# Patient Record
Sex: FEMALE | Race: WHITE | Employment: UNEMPLOYED | URBAN - METROPOLITAN AREA
[De-identification: names, ages, dates, MRNs, and addresses within clinical notes are randomized per-mention and may not be internally consistent; named-entity substitution may affect disease eponyms.]

---

## 2017-11-18 ENCOUNTER — HOSPITAL ENCOUNTER (INPATIENT)
Age: 52
LOS: 13 days | Discharge: HOME | DRG: 885 | End: 2017-12-01
Attending: PSYCHIATRY & NEUROLOGY | Admitting: PSYCHIATRY & NEUROLOGY
Payer: MEDICARE

## 2017-11-18 PROBLEM — F31.9 BIPOLAR 1 DISORDER (HCC): Status: ACTIVE | Noted: 2017-11-18

## 2017-11-18 LAB
ABSOLUTE EOS #: 0.2 K/UL (ref 0–0.4)
ABSOLUTE IMMATURE GRANULOCYTE: ABNORMAL K/UL (ref 0–0.3)
ABSOLUTE LYMPH #: 1.7 K/UL (ref 1–4.8)
ABSOLUTE MONO #: 0.9 K/UL (ref 0.1–1.3)
ALBUMIN SERPL-MCNC: 4.1 G/DL (ref 3.5–5.2)
ALBUMIN/GLOBULIN RATIO: ABNORMAL (ref 1–2.5)
ALP BLD-CCNC: 92 U/L (ref 35–104)
ALT SERPL-CCNC: 24 U/L (ref 5–33)
ANION GAP SERPL CALCULATED.3IONS-SCNC: 14 MMOL/L (ref 9–17)
AST SERPL-CCNC: 31 U/L
BASOPHILS # BLD: 1 %
BASOPHILS ABSOLUTE: 0 K/UL (ref 0–0.2)
BILIRUB SERPL-MCNC: 0.22 MG/DL (ref 0.3–1.2)
BUN BLDV-MCNC: 16 MG/DL (ref 6–20)
BUN/CREAT BLD: ABNORMAL (ref 9–20)
CALCIUM SERPL-MCNC: 9.3 MG/DL (ref 8.6–10.4)
CHLORIDE BLD-SCNC: 102 MMOL/L (ref 98–107)
CO2: 22 MMOL/L (ref 20–31)
CREAT SERPL-MCNC: 0.78 MG/DL (ref 0.5–0.9)
DIFFERENTIAL TYPE: ABNORMAL
EOSINOPHILS RELATIVE PERCENT: 3 %
GFR AFRICAN AMERICAN: >60 ML/MIN
GFR NON-AFRICAN AMERICAN: >60 ML/MIN
GFR SERPL CREATININE-BSD FRML MDRD: ABNORMAL ML/MIN/{1.73_M2}
GFR SERPL CREATININE-BSD FRML MDRD: ABNORMAL ML/MIN/{1.73_M2}
GLUCOSE BLD-MCNC: 95 MG/DL (ref 70–99)
HCT VFR BLD CALC: 36.7 % (ref 36–46)
HEMOGLOBIN: 12.7 G/DL (ref 12–16)
IMMATURE GRANULOCYTES: ABNORMAL %
LYMPHOCYTES # BLD: 28 %
MCH RBC QN AUTO: 32.8 PG (ref 26–34)
MCHC RBC AUTO-ENTMCNC: 34.5 G/DL (ref 31–37)
MCV RBC AUTO: 95.3 FL (ref 80–100)
MONOCYTES # BLD: 14 %
PDW BLD-RTO: 13.3 % (ref 11.5–14.9)
PLATELET # BLD: 372 K/UL (ref 150–450)
PLATELET ESTIMATE: ABNORMAL
PMV BLD AUTO: 6.4 FL (ref 6–12)
POTASSIUM SERPL-SCNC: 4.4 MMOL/L (ref 3.7–5.3)
RBC # BLD: 3.85 M/UL (ref 4–5.2)
RBC # BLD: ABNORMAL 10*6/UL
SEG NEUTROPHILS: 54 %
SEGMENTED NEUTROPHILS ABSOLUTE COUNT: 3.4 K/UL (ref 1.3–9.1)
SODIUM BLD-SCNC: 138 MMOL/L (ref 135–144)
THYROXINE, FREE: 0.74 NG/DL (ref 0.93–1.7)
TOTAL PROTEIN: 6.5 G/DL (ref 6.4–8.3)
TSH SERPL DL<=0.05 MIU/L-ACNC: 1.16 MIU/L (ref 0.3–5)
WBC # BLD: 6.2 K/UL (ref 3.5–11)
WBC # BLD: ABNORMAL 10*3/UL

## 2017-11-18 PROCEDURE — 6370000000 HC RX 637 (ALT 250 FOR IP): Performed by: PSYCHIATRY & NEUROLOGY

## 2017-11-18 PROCEDURE — 84443 ASSAY THYROID STIM HORMONE: CPT

## 2017-11-18 PROCEDURE — 85025 COMPLETE CBC W/AUTO DIFF WBC: CPT

## 2017-11-18 PROCEDURE — 36415 COLL VENOUS BLD VENIPUNCTURE: CPT

## 2017-11-18 PROCEDURE — 80053 COMPREHEN METABOLIC PANEL: CPT

## 2017-11-18 PROCEDURE — 1240000000 HC EMOTIONAL WELLNESS R&B

## 2017-11-18 PROCEDURE — 84439 ASSAY OF FREE THYROXINE: CPT

## 2017-11-18 RX ORDER — CARBAMAZEPINE 300 MG/1
300 CAPSULE, EXTENDED RELEASE ORAL 2 TIMES DAILY
Status: DISCONTINUED | OUTPATIENT
Start: 2017-11-18 | End: 2017-11-26

## 2017-11-18 RX ORDER — CHLORPROMAZINE HYDROCHLORIDE 50 MG/1
200 TABLET, FILM COATED ORAL NIGHTLY
Status: ON HOLD | COMMUNITY
End: 2017-11-30 | Stop reason: HOSPADM

## 2017-11-18 RX ORDER — HYDROCHLOROTHIAZIDE 25 MG/1
25 TABLET ORAL DAILY
Status: DISCONTINUED | OUTPATIENT
Start: 2017-11-18 | End: 2017-12-01 | Stop reason: HOSPADM

## 2017-11-18 RX ORDER — ONDANSETRON 4 MG/1
4 TABLET, ORALLY DISINTEGRATING ORAL EVERY 4 HOURS PRN
Status: ON HOLD | COMMUNITY
End: 2017-11-30 | Stop reason: HOSPADM

## 2017-11-18 RX ORDER — METOPROLOL TARTRATE 50 MG/1
50 TABLET, FILM COATED ORAL 2 TIMES DAILY
Status: DISCONTINUED | OUTPATIENT
Start: 2017-11-18 | End: 2017-12-01 | Stop reason: HOSPADM

## 2017-11-18 RX ORDER — ESCITALOPRAM OXALATE 20 MG/1
20 TABLET ORAL DAILY
Status: DISCONTINUED | OUTPATIENT
Start: 2017-11-18 | End: 2017-12-01 | Stop reason: HOSPADM

## 2017-11-18 RX ORDER — CLONAZEPAM 1 MG/1
1 TABLET ORAL 3 TIMES DAILY PRN
Status: DISCONTINUED | OUTPATIENT
Start: 2017-11-18 | End: 2017-11-20

## 2017-11-18 RX ORDER — CARBAMAZEPINE 100 MG/1
300 TABLET, EXTENDED RELEASE ORAL 2 TIMES DAILY
Status: ON HOLD | COMMUNITY
End: 2017-11-30 | Stop reason: HOSPADM

## 2017-11-18 RX ORDER — AMLODIPINE BESYLATE 10 MG/1
10 TABLET ORAL NIGHTLY
Status: DISCONTINUED | OUTPATIENT
Start: 2017-11-18 | End: 2017-12-01 | Stop reason: HOSPADM

## 2017-11-18 RX ORDER — ONDANSETRON 4 MG/1
4 TABLET, FILM COATED ORAL EVERY 8 HOURS PRN
Status: DISCONTINUED | OUTPATIENT
Start: 2017-11-18 | End: 2017-12-01 | Stop reason: HOSPADM

## 2017-11-18 RX ORDER — HYDRALAZINE HYDROCHLORIDE 50 MG/1
50 TABLET, FILM COATED ORAL 2 TIMES DAILY
Status: ON HOLD | COMMUNITY
End: 2017-11-30

## 2017-11-18 RX ORDER — QUETIAPINE FUMARATE 200 MG/1
400 TABLET, FILM COATED ORAL NIGHTLY
Status: DISCONTINUED | OUTPATIENT
Start: 2017-11-18 | End: 2017-12-01 | Stop reason: HOSPADM

## 2017-11-18 RX ORDER — POLYETHYLENE GLYCOL 3350 17 G/17G
17 POWDER, FOR SOLUTION ORAL DAILY PRN
Status: ON HOLD | COMMUNITY
End: 2017-11-30

## 2017-11-18 RX ORDER — HYDROCHLOROTHIAZIDE 25 MG/1
25 TABLET ORAL DAILY
Status: ON HOLD | COMMUNITY
End: 2017-11-30

## 2017-11-18 RX ORDER — GABAPENTIN 400 MG/1
400 CAPSULE ORAL 2 TIMES DAILY
Status: ON HOLD | COMMUNITY
End: 2017-11-30

## 2017-11-18 RX ORDER — CLONAZEPAM 1 MG/1
1 TABLET ORAL 3 TIMES DAILY
Status: ON HOLD | COMMUNITY
End: 2017-11-30

## 2017-11-18 RX ORDER — DOXEPIN HYDROCHLORIDE 50 MG/1
100 CAPSULE ORAL NIGHTLY
Status: DISCONTINUED | OUTPATIENT
Start: 2017-11-18 | End: 2017-12-01 | Stop reason: HOSPADM

## 2017-11-18 RX ORDER — QUETIAPINE 400 MG/1
400 TABLET, FILM COATED, EXTENDED RELEASE ORAL NIGHTLY
Status: ON HOLD | COMMUNITY
End: 2017-11-30

## 2017-11-18 RX ORDER — GABAPENTIN 400 MG/1
400 CAPSULE ORAL 2 TIMES DAILY
Status: DISCONTINUED | OUTPATIENT
Start: 2017-11-18 | End: 2017-12-01 | Stop reason: HOSPADM

## 2017-11-18 RX ORDER — METOPROLOL TARTRATE 50 MG/1
50 TABLET, FILM COATED ORAL 2 TIMES DAILY
Status: ON HOLD | COMMUNITY
End: 2017-11-30

## 2017-11-18 RX ORDER — NICOTINE 21 MG/24HR
1 PATCH, TRANSDERMAL 24 HOURS TRANSDERMAL DAILY
Status: DISCONTINUED | OUTPATIENT
Start: 2017-11-18 | End: 2017-11-23

## 2017-11-18 RX ORDER — HYDRALAZINE HYDROCHLORIDE 50 MG/1
50 TABLET, FILM COATED ORAL EVERY 12 HOURS SCHEDULED
Status: DISCONTINUED | OUTPATIENT
Start: 2017-11-18 | End: 2017-12-01 | Stop reason: HOSPADM

## 2017-11-18 RX ORDER — CHLORPROMAZINE HYDROCHLORIDE 50 MG/1
100 TABLET, FILM COATED ORAL DAILY
Status: ON HOLD | COMMUNITY
End: 2017-11-30 | Stop reason: HOSPADM

## 2017-11-18 RX ORDER — CHLORPROMAZINE HYDROCHLORIDE 50 MG/1
200 TABLET, FILM COATED ORAL DAILY
Status: ON HOLD | COMMUNITY
End: 2017-11-30 | Stop reason: HOSPADM

## 2017-11-18 RX ORDER — ATORVASTATIN CALCIUM 10 MG/1
10 TABLET, FILM COATED ORAL NIGHTLY
Status: DISCONTINUED | OUTPATIENT
Start: 2017-11-18 | End: 2017-12-01 | Stop reason: HOSPADM

## 2017-11-18 RX ORDER — ESCITALOPRAM OXALATE 10 MG/1
10 TABLET ORAL DAILY
Status: ON HOLD | COMMUNITY
End: 2017-11-30 | Stop reason: HOSPADM

## 2017-11-18 RX ORDER — HYDROXYZINE HYDROCHLORIDE 25 MG/1
25 TABLET, FILM COATED ORAL 3 TIMES DAILY PRN
Status: DISCONTINUED | OUTPATIENT
Start: 2017-11-18 | End: 2017-12-01 | Stop reason: HOSPADM

## 2017-11-18 RX ORDER — ACETAMINOPHEN 325 MG/1
325 TABLET ORAL EVERY 8 HOURS PRN
Status: DISCONTINUED | OUTPATIENT
Start: 2017-11-18 | End: 2017-12-01 | Stop reason: HOSPADM

## 2017-11-18 RX ORDER — DOXEPIN HYDROCHLORIDE 100 MG/1
100 CAPSULE ORAL NIGHTLY
Status: ON HOLD | COMMUNITY
End: 2017-11-30

## 2017-11-18 RX ORDER — FLUTICASONE PROPIONATE 50 MCG
1 SPRAY, SUSPENSION (ML) NASAL DAILY
Status: DISCONTINUED | OUTPATIENT
Start: 2017-11-18 | End: 2017-11-24

## 2017-11-18 RX ORDER — POLYETHYLENE GLYCOL 3350 17 G/17G
17 POWDER, FOR SOLUTION ORAL DAILY
Status: DISCONTINUED | OUTPATIENT
Start: 2017-11-18 | End: 2017-12-01 | Stop reason: HOSPADM

## 2017-11-18 RX ADMIN — HYDROCHLOROTHIAZIDE 25 MG: 25 TABLET ORAL at 12:47

## 2017-11-18 RX ADMIN — QUETIAPINE FUMARATE 400 MG: 200 TABLET ORAL at 22:23

## 2017-11-18 RX ADMIN — POLYETHYLENE GLYCOL 3350 17 G: 17 POWDER, FOR SOLUTION ORAL at 13:54

## 2017-11-18 RX ADMIN — METOPROLOL TARTRATE 50 MG: 50 TABLET ORAL at 22:22

## 2017-11-18 RX ADMIN — CLONAZEPAM 1 MG: 1 TABLET ORAL at 17:11

## 2017-11-18 RX ADMIN — ATORVASTATIN CALCIUM 10 MG: 10 TABLET, FILM COATED ORAL at 22:22

## 2017-11-18 RX ADMIN — HYDRALAZINE HYDROCHLORIDE 50 MG: 50 TABLET, FILM COATED ORAL at 22:29

## 2017-11-18 RX ADMIN — FLUTICASONE PROPIONATE 1 SPRAY: 50 SPRAY, METERED NASAL at 13:54

## 2017-11-18 RX ADMIN — ESCITALOPRAM OXALATE 20 MG: 20 TABLET ORAL at 12:47

## 2017-11-18 RX ADMIN — METOPROLOL TARTRATE 50 MG: 50 TABLET ORAL at 12:47

## 2017-11-18 RX ADMIN — CARBAMAZEPINE 300 MG: 300 CAPSULE, EXTENDED RELEASE ORAL at 13:54

## 2017-11-18 RX ADMIN — AMLODIPINE BESYLATE 10 MG: 10 TABLET ORAL at 22:22

## 2017-11-18 RX ADMIN — HYDROXYZINE HYDROCHLORIDE 25 MG: 25 TABLET, FILM COATED ORAL at 22:22

## 2017-11-18 RX ADMIN — CARIPRAZINE 1.5 MG: 1.5 CAPSULE, GELATIN COATED ORAL at 13:54

## 2017-11-18 RX ADMIN — GABAPENTIN 400 MG: 400 CAPSULE ORAL at 12:47

## 2017-11-18 RX ADMIN — GABAPENTIN 400 MG: 400 CAPSULE ORAL at 22:22

## 2017-11-18 RX ADMIN — CARBAMAZEPINE 300 MG: 300 CAPSULE, EXTENDED RELEASE ORAL at 22:29

## 2017-11-18 RX ADMIN — DOXEPIN HYDROCHLORIDE 100 MG: 50 CAPSULE ORAL at 22:26

## 2017-11-18 ASSESSMENT — SLEEP AND FATIGUE QUESTIONNAIRES
AVERAGE NUMBER OF SLEEP HOURS: 4
DIFFICULTY STAYING ASLEEP: YES
DO YOU HAVE DIFFICULTY SLEEPING: YES
RESTFUL SLEEP: NO
SLEEP PATTERN: RESTLESSNESS;DIFFICULTY FALLING ASLEEP;DISTURBED/INTERRUPTED SLEEP
DO YOU USE A SLEEP AID: YES
DIFFICULTY ARISING: NO
DIFFICULTY FALLING ASLEEP: YES

## 2017-11-18 ASSESSMENT — PATIENT HEALTH QUESTIONNAIRE - PHQ9: SUM OF ALL RESPONSES TO PHQ QUESTIONS 1-9: 18

## 2017-11-18 ASSESSMENT — LIFESTYLE VARIABLES: HISTORY_ALCOHOL_USE: NO

## 2017-11-18 NOTE — PLAN OF CARE
Problem: Altered Mood, Depressive Behavior  Goal: LTG-Able to verbalize and/or display a decrease in depressive symptoms  Outcome: Ongoing  5 [facility]  Initial Interdisciplinary Treatment Plan NOTE    Original treatment plan Date & Time: 11/18/17 0825    Admission Type:  Admission Type: Voluntary    Reason for admission:   Reason for Admission: depression    Estimated Length of Stay:  5-7days  Estimated Discharge Date:  11/24/17 to be determined by physician    PATIENT STRENGTHS:  Patient Strengths:Strengths: Medication Compliance  Patient Strengths and Limitations:Limitations: Difficulty problem solving/relies on others to help solve problems  Addictive Behavior: Addictive Behavior  In the past 3 months, have you felt or has someone told you that you have a problem with:  : None  Do you have a history of Chemical Use?: No  Do you have a history of Alcohol Use?: No  Do you have a history of Street Drug Abuse?: No  Histroy of Prescripton Drug Abuse?: No  Medical Problems:  Past Medical History:   Diagnosis Date    Bipolar 1 disorder ([facility]     Brain aneurysm     coil stent placement    GERD (gastroesophageal reflux disease)     Hyperlipidemia     Hypertension     Interstitial lung disease ([facility]     Panic attacks      Status EXAM:Status and Exam  Normal: No  Facial Expression: Avoids Gaze, Flat, Expressionless  Affect: Appropriate  Level of Consciousness: Alert  Mood:Normal: No  Mood: Depressed, Helpless  Motor Activity:Normal: Yes  Interview Behavior: Cooperative, Evasive  Preception: [place] to Person, Antonetta Shave to Time, [place] to Place, [place] to Situation  Attention:Normal: No  Attention: Distractible  Thought Processes: Circumstantial  Thought Content:Normal: No  Thought Content: Preoccupations, Poverty of Content  Hallucinations: None  Delusions: No  Memory:Normal: No  Memory:  (WNL)  Insight and Judgment: No  Insight and Judgment: Poor Judgment, Poor Insight  Present Suicidal Ideation: No  Present Homicidal Ideation: No    EDUCATION:   Learner Progress Toward Treatment Goals:  Reviewed group plans and strategies for care    Method:  Group therapy, medication compliance, individualized assessments and care planning    Outcome:  Needs reinforcement    PATIENT GOALS:  Pt did not identify, to be discussed with patient within 72 hours.     PLAN/TREATMENT RECOMMENDATIONS:   Group therapy, medications compliance, goal setting, individualized assessments and care, continue to monitor pt on unit      SHORT-TERM GOALS:   Time frame for Short-Term Goals:  5-7 days    LONG-TERM GOALS:  Time frame for Long-Term Goals:  6 months    Members Present in Team Meeting:     SP Lisa  Goal: STG-Able to verbalize suicidal ideations  Outcome: Ongoing

## 2017-11-18 NOTE — PLAN OF CARE
Problem: Altered Mood, Depressive Behavior  Goal: STG-Able to verbalize suicidal ideations  Outcome: Ongoing  Pt was able to verbalize a current absence in suicidal ideations. Pt denies thoughts to hurt self or others. Pt denies all hallucinations. Pt was observed socializing with staff and peers. Pt was encouraged to communicate with staff if symptoms worsen or needs arise. Pt independence was promoted. Pt remained isolative.

## 2017-11-19 PROCEDURE — 1240000000 HC EMOTIONAL WELLNESS R&B

## 2017-11-19 PROCEDURE — 6370000000 HC RX 637 (ALT 250 FOR IP): Performed by: PSYCHIATRY & NEUROLOGY

## 2017-11-19 RX ORDER — SUCRALFATE 1 G/1
1 TABLET ORAL
Status: DISCONTINUED | OUTPATIENT
Start: 2017-11-19 | End: 2017-12-01 | Stop reason: HOSPADM

## 2017-11-19 RX ADMIN — METOPROLOL TARTRATE 50 MG: 50 TABLET ORAL at 21:19

## 2017-11-19 RX ADMIN — QUETIAPINE FUMARATE 400 MG: 200 TABLET ORAL at 21:19

## 2017-11-19 RX ADMIN — CARIPRAZINE 1.5 MG: 1.5 CAPSULE, GELATIN COATED ORAL at 08:22

## 2017-11-19 RX ADMIN — CLONAZEPAM 1 MG: 1 TABLET ORAL at 03:28

## 2017-11-19 RX ADMIN — ACETAMINOPHEN 325 MG: 325 TABLET, FILM COATED ORAL at 19:48

## 2017-11-19 RX ADMIN — GABAPENTIN 400 MG: 400 CAPSULE ORAL at 21:19

## 2017-11-19 RX ADMIN — CARBAMAZEPINE 300 MG: 300 CAPSULE, EXTENDED RELEASE ORAL at 08:22

## 2017-11-19 RX ADMIN — METOPROLOL TARTRATE 50 MG: 50 TABLET ORAL at 08:22

## 2017-11-19 RX ADMIN — AMLODIPINE BESYLATE 10 MG: 10 TABLET ORAL at 21:19

## 2017-11-19 RX ADMIN — ESCITALOPRAM OXALATE 20 MG: 20 TABLET ORAL at 08:22

## 2017-11-19 RX ADMIN — HYDROCHLOROTHIAZIDE 25 MG: 25 TABLET ORAL at 08:23

## 2017-11-19 RX ADMIN — HYDRALAZINE HYDROCHLORIDE 50 MG: 50 TABLET, FILM COATED ORAL at 08:22

## 2017-11-19 RX ADMIN — HYDROXYZINE HYDROCHLORIDE 25 MG: 25 TABLET, FILM COATED ORAL at 21:19

## 2017-11-19 RX ADMIN — LINACLOTIDE 145 MCG: 145 CAPSULE, GELATIN COATED ORAL at 06:33

## 2017-11-19 RX ADMIN — CLONAZEPAM 1 MG: 1 TABLET ORAL at 10:54

## 2017-11-19 RX ADMIN — ATORVASTATIN CALCIUM 10 MG: 10 TABLET, FILM COATED ORAL at 21:19

## 2017-11-19 RX ADMIN — FLUTICASONE PROPIONATE 1 SPRAY: 50 SPRAY, METERED NASAL at 08:24

## 2017-11-19 RX ADMIN — POLYETHYLENE GLYCOL 3350 17 G: 17 POWDER, FOR SOLUTION ORAL at 08:24

## 2017-11-19 RX ADMIN — CARBAMAZEPINE 300 MG: 300 CAPSULE, EXTENDED RELEASE ORAL at 21:19

## 2017-11-19 RX ADMIN — SUCRALFATE 1 G: 1 TABLET ORAL at 16:42

## 2017-11-19 RX ADMIN — DOXEPIN HYDROCHLORIDE 100 MG: 50 CAPSULE ORAL at 21:19

## 2017-11-19 RX ADMIN — GABAPENTIN 400 MG: 400 CAPSULE ORAL at 08:22

## 2017-11-19 RX ADMIN — CLONAZEPAM 1 MG: 1 TABLET ORAL at 21:19

## 2017-11-19 RX ADMIN — ACETAMINOPHEN 325 MG: 325 TABLET, FILM COATED ORAL at 06:33

## 2017-11-19 RX ADMIN — HYDROXYZINE HYDROCHLORIDE 25 MG: 25 TABLET, FILM COATED ORAL at 08:23

## 2017-11-19 RX ADMIN — HYDRALAZINE HYDROCHLORIDE 50 MG: 50 TABLET, FILM COATED ORAL at 21:19

## 2017-11-19 ASSESSMENT — SLEEP AND FATIGUE QUESTIONNAIRES
DO YOU USE A SLEEP AID: YES
DIFFICULTY FALLING ASLEEP: YES
DO YOU HAVE DIFFICULTY SLEEPING: YES
AVERAGE NUMBER OF SLEEP HOURS: 4
DIFFICULTY ARISING: YES
DIFFICULTY STAYING ASLEEP: YES
SLEEP PATTERN: DIFFICULTY FALLING ASLEEP;RESTLESSNESS;INSOMNIA
RESTFUL SLEEP: NO

## 2017-11-19 ASSESSMENT — PAIN DESCRIPTION - DESCRIPTORS: DESCRIPTORS: HEADACHE

## 2017-11-19 ASSESSMENT — LIFESTYLE VARIABLES: HISTORY_ALCOHOL_USE: NO

## 2017-11-19 ASSESSMENT — PAIN DESCRIPTION - FREQUENCY: FREQUENCY: INTERMITTENT

## 2017-11-19 ASSESSMENT — PAIN SCALES - GENERAL
PAINLEVEL_OUTOF10: 6
PAINLEVEL_OUTOF10: 3
PAINLEVEL_OUTOF10: 1
PAINLEVEL_OUTOF10: 0

## 2017-11-19 ASSESSMENT — PAIN DESCRIPTION - PAIN TYPE: TYPE: ACUTE PAIN

## 2017-11-19 ASSESSMENT — PAIN DESCRIPTION - LOCATION: LOCATION: HEAD

## 2017-11-19 NOTE — CARE COORDINATION
BHI Biopsychosocial Assessment    Current Level of Psychosocial Functioning     Independent   Dependent  X  Minimal Assist     Comments:  Writer meets with PT and completes the biopsychosocial assessment. PT presents with elevated mood, anxiety and hyperactivity. PT states has feelings of sadness, hopelessness, worthlessness and depressed mood. PT states feeling sad on a daily basis for the past few weeks and have been very lonely living in a hotel room by myself.   PT has a loss of interest in doing pleasurable activities; no appetite; and, sleeping only a few hours a night. PT states \"I'm hoping to live with my daughter Luiza Robert when I get discharged. \"  Uvaldo Hurt will get an JENNIFER for daughter and [employer]. PT states family history of mental illness. PT states lost her home; has been staying at a hotel; and has been trying to get additional life insurance \"in case I die because $5,000 will not bury me. \"  PT states ; has 3 sisters and 1 brother, states all have mental health issues. PT states \"I'm not really feeling like I want to hurt myself right now and feel safe. \"  PT also states \"I'm so happy I have Dr. Alberto Lechuga because he listens to me. \"  PT states has 2 daughters and limited contact except with 1 daughter, Luiza Robert. During assessment writer and PT talk about behavioral plan of care while at [facility] to include: group psychotherapy, recreational therapy, recovery group, spirituality group, medication education group as well as psycho-educational group. Writer also talks with PT about the importance of being active in treatment planning, which will be discussed within 48 hours of being admitted.     Psychosocial High Risk Factors (check all that apply)    Unable to obtain meds   Chronic illness/pain    Substance abuse   Lack of Family Support X  Financial stress X  Isolation X  Inadequate Community Resources X  Suicide attempt(s) X  Not taking medications X  Victim of crime   Developmental Delay  Unable to manage personal needs    Age 72 or older   Homeless X  No transportation X  Readmission within 30 days  Unemployment X  Traumatic Event    Psychiatric Advanced Directives: Unknown    Family to Involve in Treatment: PT states no    Sexual Orientation:  Unknown    Patient Strengths: SSI; Medicare/Medicaid; linked with [facility]    Patient Barriers: SI and history; homelessness; living in hotel and out of money; poor family and friend support; poor insight/ poor judgment; difficulty problem solving/relies on others to help solve problems; lacks motivation; on/off medications; family history of mental illness; multiple life stressors and financial difficulties. Opiate Education Provided:  PT states \"does not use any drugs. \"    CMHC/mental health history: [facility]    Plan of Care   medication management, group/individual therapies, family meetings, psycho -education, treatment team meetings to assist with stabilization    Initial Discharge Plan:  PT states when discharged can go and live with daughter, Taj Workman will need to get a JENNIFER. PT will have an appointment with Marcene Dubin when discharged.     Clinical Summary:  PT diagnosis:  Bipolar I Disorder, depressive type; Suicidal Ideation

## 2017-11-19 NOTE — PLAN OF CARE
to Place, [place] to Situation  Attention:Normal: No  Attention: Hyperalert  Thought Processes: Flt.of Ideas  Thought Content:Normal: No  Thought Content: Delusions, Preoccupations, Obsessions  Hallucinations: None  Delusions: No  Memory:Normal: Yes  Memory:  (WNL)  Insight and Judgment: No  Insight and Judgment: Poor Judgment, Poor Insight, Unrealistic  Present Suicidal Ideation: No  Present Homicidal Ideation: No    Daily Assessment Last Entry:   Daily Sleep (WDL): Within Defined Limits         Patient Currently in Pain: Denies  Daily Nutrition (WDL): Within Defined Limits    Patient Monitoring:  Frequency of Checks: 4 times per hour, close    Psychiatric Symptoms:   Depression Symptoms  Depression Symptoms: Loss of interest  Anxiety Symptoms  Anxiety Symptoms: Generalized  Yary Symptoms  Yary Symptoms: No problems reported or observed. Psychosis Symptoms  Delusion Type: No problems reported or observed.     Suicide Risk CSSR-S:  1) Within the past month, have you wished you were dead or wished you could go to sleep and not wake up? : NO  2) Within the past month, have you actually had any thoughts of killing yourself? : NO  6)  Have you ever done anything, started to do anything, or prepared to do anything to end your life?: NO  Change in Result no Change in Plan of care no      EDUCATION:   EDUCATION:   Learner Progress Toward Treatment Goals: Reviewed results and recommendations of this team, Reviewed group plan and strategies, Reviewed signs, symptoms and risk of self harm and violent behavior, Reviewed goals and plan of care    Method:small group, individual verbal education    Outcome:verbalized by patient, but needs reinforcement to obtain goals    PATIENT GOALS:  Short term: decreasing suicidal thougths   Long term: not to overthink things    PLAN/TREATMENT RECOMMENDATIONS UPDATE: continue with group therapies, increased socialization, continue planning for after discharge goals, continue with medication compliance    SHORT-TERM GOALS UPDATE:   Time frame for Short-Term Goals: 5-7 days    LONG-TERM GOALS UPDATE:   Time frame for Long-Term Goals: 6 months  Members Present in Team Meeting: See Signature Sheet    SP CAMARGO  Goal: STG-Able to verbalize suicidal ideations  Outcome: Ongoing

## 2017-11-19 NOTE — PLAN OF CARE
Problem: Altered Mood, Depressive Behavior  Goal: LTG-Able to verbalize and/or display a decrease in depressive symptoms  Outcome: Ongoing  Pt was out in the milieu for long intervals. She was social with peers. She was medication compliant. She stated that she is feeling better. Goal: STG-Able to verbalize suicidal ideations  Outcome: Ongoing  Pt denied current suicidal ideations.

## 2017-11-19 NOTE — PROGRESS NOTES
PSYCHOEDUCATION GROUP NOTE    Date:   11/19/2017 Start Time: 0845  End Time: 0915    Number Participants in Group:  7    Goal:  Patient will demonstrate increased interpersonal interaction   Topic: community meeting / goal setting    Discipline Responsible:   OT  AT  [facility]. X RT MHP Other       Participation Level:     None  Minimal   X Active Listener X Interactive    Monopolizing         Participation Quality:  X Appropriate  Inappropriate   X       Attentive        Intrusive   X       Sharing        Resistant   X       Supportive        Lethargic       Affective:    Congruent  Incongruent  Blunted  Flat    Constricted  Anxious  Elated  Angry    Labile  Depressed  Other x bright       Cognitive:  X Alert X Oriented PPTP     Concentration X G  F  P   Attention Span X G  F  P   Short-Term Memory  G  F  P   Long-Term Memory  G  F  P   ProblemSolving/  Decision Making X G  F  P   Ability to Process  Information X G  F  P      Contributing Factors             Delusional             Hallucinating             Flight of Ideas             Other:       Modes of Intervention:  x Education x Support x Exploration    Clarifying x Problem Solving  Confrontation   x Socialization  Limit Setting  Reality Testing   x Activity  Movement  Media    Other:            Response to Learning:  X Able to verbalize current knowledge/experience   X Able to verbalize/acknowledge new learning   X Able to retain information   X Capable of insight    Able to change behavior   X Progressing to goal    Other:        Comments

## 2017-11-19 NOTE — PLAN OF CARE
Problem: Altered Mood, Depressive Behavior  Intervention: Self-harm assessment-behavioral health  Pt denies having any thoughts of wanting to harm self or others, safety checks are being continued and maintained throughout writers shift.

## 2017-11-20 ENCOUNTER — APPOINTMENT (OUTPATIENT)
Dept: CT IMAGING | Age: 52
DRG: 885 | End: 2017-11-20
Attending: PSYCHIATRY & NEUROLOGY
Payer: MEDICARE

## 2017-11-20 LAB
EKG ATRIAL RATE: 69 BPM
EKG P AXIS: 58 DEGREES
EKG P-R INTERVAL: 176 MS
EKG Q-T INTERVAL: 428 MS
EKG QRS DURATION: 86 MS
EKG QTC CALCULATION (BAZETT): 458 MS
EKG R AXIS: -8 DEGREES
EKG T AXIS: 55 DEGREES
EKG VENTRICULAR RATE: 69 BPM

## 2017-11-20 PROCEDURE — 99234 HOSP IP/OBS SM DT SF/LOW 45: CPT | Performed by: INTERNAL MEDICINE

## 2017-11-20 PROCEDURE — 94664 DEMO&/EVAL PT USE INHALER: CPT

## 2017-11-20 PROCEDURE — 70470 CT HEAD/BRAIN W/O & W/DYE: CPT

## 2017-11-20 PROCEDURE — 2580000003 HC RX 258: Performed by: PSYCHIATRY & NEUROLOGY

## 2017-11-20 PROCEDURE — 6370000000 HC RX 637 (ALT 250 FOR IP): Performed by: PSYCHIATRY & NEUROLOGY

## 2017-11-20 PROCEDURE — 1240000000 HC EMOTIONAL WELLNESS R&B

## 2017-11-20 PROCEDURE — 6360000004 HC RX CONTRAST MEDICATION: Performed by: PSYCHIATRY & NEUROLOGY

## 2017-11-20 PROCEDURE — 93005 ELECTROCARDIOGRAM TRACING: CPT

## 2017-11-20 RX ORDER — 0.9 % SODIUM CHLORIDE 0.9 %
100 INTRAVENOUS SOLUTION INTRAVENOUS ONCE
Status: COMPLETED | OUTPATIENT
Start: 2017-11-20 | End: 2017-11-20

## 2017-11-20 RX ORDER — SODIUM CHLORIDE 0.9 % (FLUSH) 0.9 %
10 SYRINGE (ML) INJECTION PRN
Status: DISCONTINUED | OUTPATIENT
Start: 2017-11-20 | End: 2017-12-01 | Stop reason: HOSPADM

## 2017-11-20 RX ORDER — ALBUTEROL SULFATE 90 UG/1
2 AEROSOL, METERED RESPIRATORY (INHALATION) EVERY 6 HOURS PRN
Status: DISCONTINUED | OUTPATIENT
Start: 2017-11-20 | End: 2017-12-01 | Stop reason: HOSPADM

## 2017-11-20 RX ORDER — CLONAZEPAM 1 MG/1
1 TABLET ORAL 3 TIMES DAILY
Status: DISCONTINUED | OUTPATIENT
Start: 2017-11-20 | End: 2017-12-01 | Stop reason: HOSPADM

## 2017-11-20 RX ADMIN — AMLODIPINE BESYLATE 10 MG: 10 TABLET ORAL at 21:12

## 2017-11-20 RX ADMIN — HYDRALAZINE HYDROCHLORIDE 50 MG: 50 TABLET, FILM COATED ORAL at 21:43

## 2017-11-20 RX ADMIN — HYDRALAZINE HYDROCHLORIDE 50 MG: 50 TABLET, FILM COATED ORAL at 09:12

## 2017-11-20 RX ADMIN — GABAPENTIN 400 MG: 400 CAPSULE ORAL at 09:12

## 2017-11-20 RX ADMIN — GABAPENTIN 400 MG: 400 CAPSULE ORAL at 21:13

## 2017-11-20 RX ADMIN — CLONAZEPAM 1 MG: 1 TABLET ORAL at 13:34

## 2017-11-20 RX ADMIN — CLONAZEPAM 1 MG: 1 TABLET ORAL at 21:13

## 2017-11-20 RX ADMIN — METOPROLOL TARTRATE 50 MG: 50 TABLET ORAL at 21:12

## 2017-11-20 RX ADMIN — CARIPRAZINE 1.5 MG: 1.5 CAPSULE, GELATIN COATED ORAL at 09:12

## 2017-11-20 RX ADMIN — CARBAMAZEPINE 300 MG: 300 CAPSULE, EXTENDED RELEASE ORAL at 09:12

## 2017-11-20 RX ADMIN — HYDROCHLOROTHIAZIDE 25 MG: 25 TABLET ORAL at 09:12

## 2017-11-20 RX ADMIN — DOXEPIN HYDROCHLORIDE 100 MG: 50 CAPSULE ORAL at 21:43

## 2017-11-20 RX ADMIN — SUCRALFATE 1 G: 1 TABLET ORAL at 12:01

## 2017-11-20 RX ADMIN — FLUTICASONE PROPIONATE 1 SPRAY: 50 SPRAY, METERED NASAL at 09:12

## 2017-11-20 RX ADMIN — ACETAMINOPHEN 325 MG: 325 TABLET, FILM COATED ORAL at 06:22

## 2017-11-20 RX ADMIN — HYDROXYZINE HYDROCHLORIDE 25 MG: 25 TABLET, FILM COATED ORAL at 21:12

## 2017-11-20 RX ADMIN — HYDROXYZINE HYDROCHLORIDE 25 MG: 25 TABLET, FILM COATED ORAL at 09:12

## 2017-11-20 RX ADMIN — SUCRALFATE 1 G: 1 TABLET ORAL at 06:22

## 2017-11-20 RX ADMIN — ATORVASTATIN CALCIUM 10 MG: 10 TABLET, FILM COATED ORAL at 21:13

## 2017-11-20 RX ADMIN — SODIUM CHLORIDE 100 ML: 9 INJECTION, SOLUTION INTRAVENOUS at 11:12

## 2017-11-20 RX ADMIN — IOPAMIDOL 100 ML: 755 INJECTION, SOLUTION INTRAVENOUS at 11:12

## 2017-11-20 RX ADMIN — POLYETHYLENE GLYCOL 3350 17 G: 17 POWDER, FOR SOLUTION ORAL at 09:12

## 2017-11-20 RX ADMIN — Medication 10 ML: at 11:12

## 2017-11-20 RX ADMIN — CARBAMAZEPINE 300 MG: 300 CAPSULE, EXTENDED RELEASE ORAL at 21:13

## 2017-11-20 RX ADMIN — QUETIAPINE FUMARATE 400 MG: 200 TABLET ORAL at 21:12

## 2017-11-20 RX ADMIN — LINACLOTIDE 145 MCG: 145 CAPSULE, GELATIN COATED ORAL at 06:22

## 2017-11-20 RX ADMIN — SUCRALFATE 1 G: 1 TABLET ORAL at 17:08

## 2017-11-20 RX ADMIN — ESCITALOPRAM OXALATE 20 MG: 20 TABLET ORAL at 09:12

## 2017-11-20 RX ADMIN — METOPROLOL TARTRATE 50 MG: 50 TABLET ORAL at 09:12

## 2017-11-20 RX ADMIN — ACETAMINOPHEN 325 MG: 325 TABLET, FILM COATED ORAL at 21:12

## 2017-11-20 RX ADMIN — CLONAZEPAM 1 MG: 1 TABLET ORAL at 06:22

## 2017-11-20 RX ADMIN — ALBUTEROL SULFATE 2 PUFF: 90 AEROSOL, METERED RESPIRATORY (INHALATION) at 16:54

## 2017-11-20 ASSESSMENT — PAIN SCALES - GENERAL
PAINLEVEL_OUTOF10: 6
PAINLEVEL_OUTOF10: 2
PAINLEVEL_OUTOF10: 0
PAINLEVEL_OUTOF10: 0

## 2017-11-20 ASSESSMENT — ENCOUNTER SYMPTOMS
SHORTNESS OF BREATH: 0
ABDOMINAL PAIN: 0

## 2017-11-20 NOTE — PLAN OF CARE
Problem: Altered Mood, Depressive Behavior  Goal: LTG-Able to verbalize and/or display a decrease in depressive symptoms  Outcome: Ongoing  Pt visible on the unit, social with peers. Pt denies SI, states she continues to feel depressed and hopeless related to the holiday this week, states she continues to feel grief over her mother's death. Pt encouraged to attend groups and seek out staff as needed, will monitor for safety and offer support as needed.

## 2017-11-20 NOTE — CONSULTS
[address] [place] [address]  [] yes                                                          Consult follow up visit Note  [] yes                                                                  Date:   11/20/2017  Patient name: Haley Coffman  Date of admission:  11/18/2017  3:37 AM  MRN:   [id number]  YOB: 1965     Chief Complaint:    Patient admitted to [address] [place] [address]    Patient seen at the request of Gael Hutchins MD   For medical management of               MEDICAL COMORBIDITY [address] [place] [address] . Active Problems:    Essential hypertension    Bipolar 1 disorder ([facility]         Admitting History as noted in chart reviewed . HISTORY OF PRESENT ILLNESS:   The patient is a 48 y.o.  male . transferred from ___                                                                                                        Patient being seen by our service for ;  Evaluation , management of ;  Patient has following         [x]HYPERTENSION   HYPERLIPIDEMIA       []    []    []       []    []           Active Problems:    Essential hypertension    Bipolar 1 disorder ([facility]       [address]    has a past medical history of Bipolar 1 disorder ([facility] Brain aneurysm; GERD (gastroesophageal reflux disease); Hyperlipidemia; Hypertension; Interstitial lung disease ([facility] and Panic attacks. has a past surgical history that includes partial hysterectomy (cervix not removed) and Nasal septum surgery. Morphine     reports that she has been smoking. She has never used smokeless tobacco. She reports that she does not drink alcohol or use drugs. REVIEW OF SYSTEMS      Review of Systems   Constitutional: Negative for malaise/fatigue and weight loss. Respiratory: Negative for shortness of breath.     Cardiovascular: Negative for chest pain, palpitations and leg swelling. Gastrointestinal: Negative for abdominal pain. Musculoskeletal: Negative for myalgias. Skin: Negative for rash. Neurological: Negative for focal weakness. Psychiatric/Behavioral: Positive for depression. The patient is nervous/anxious. PHYSICAL EXAM     BP (!) 128/111   Pulse 64   Temp 97.5 °F (36.4 °C) (Oral)   Resp 15   Ht 5' 5\" (1.651 m)   Wt 210 lb (95.3 kg)   SpO2 97%   BMI 34.95 kg/m²   Body mass index is 34.95 kg/m². Physical Exam   Constitutional: She is cooperative. Neck: Carotid bruit is not present. No thyroid mass and no thyromegaly present. Cardiovascular: Normal rate, regular rhythm and normal heart sounds. Exam reveals no S3. No murmur heard. Pulmonary/Chest: Effort normal and breath sounds normal.   Neurological: She is alert. She has normal strength. Skin: Skin is warm and dry. No rash noted. Psychiatric: Thought content is delusional. She expresses impulsivity. She exhibits a depressed mood.         DIAGNOSTICS                 vitals / serial labs / CURRENT MEDS  - reviewed           Vitals:    11/18/17 1909 11/19/17 0713 11/19/17 1906 11/20/17 0729   BP: (!) 142/90 (!) 151/98 132/85 (!) 128/111   Pulse: 68 67 72 64   Resp: 14 15 14 15   Temp: 98.4 °F (36.9 °C) 97.3 °F (36.3 °C) 98.2 °F (36.8 °C) 97.5 °F (36.4 °C)   TempSrc: Oral Oral  Oral   SpO2:       Weight:       Height:                   Lab Results   Component Value Date    HGB 12.7 11/18/2017    HGB 14.0 08/24/2016     Lab Results   Component Value Date    CREATININE 0.78 11/18/2017    CREATININE 0.74 08/24/2016     No components found for: POTASSIUM  No components found for: GLU      Results for orders placed or performed during the hospital encounter of 11/18/17   Comprehensive metabolic panel   Result Value Ref Range    Glucose 95 70 - 99 mg/dL    BUN 16 6 - 20 mg/dL    CREATININE 0.78 0.50 - 0.90 mg/dL    Bun/Cre Ratio NOT REPORTED 9 - 20    Calcium 9.3 8.6 - 10.4 mg/dL    Sodium 138 135 - 144 mmol/L    Potassium 4.4 3.7 - 5.3 mmol/L    Chloride 102 98 - 107 mmol/L    CO2 22 20 - 31 mmol/L    Anion Gap 14 9 - 17 mmol/L    Alkaline Phosphatase 92 35 - 104 U/L    ALT 24 5 - 33 U/L    AST 31 <32 U/L    Total Bilirubin 0.22 (L) 0.3 - 1.2 mg/dL    Total Protein 6.5 6.4 - 8.3 g/dL    Alb 4.1 3.5 - 5.2 g/dL    Albumin/Globulin Ratio NOT REPORTED 1.0 - 2.5    GFR Non-African American >60 >60 mL/min    GFR African American >60 >60 mL/min    GFR Comment          GFR Staging NOT REPORTED    TSH without Reflex   Result Value Ref Range    TSH 1.16 0.30 - 5.00 mIU/L   T4, free   Result Value Ref Range    Thyroxine, Free 0.74 (L) 0.93 - 1.70 ng/dL   CBC auto differential   Result Value Ref Range    WBC 6.2 3.5 - 11.0 k/uL    RBC 3.85 (L) 4.0 - 5.2 m/uL    Hemoglobin 12.7 12.0 - 16.0 g/dL    Hematocrit 36.7 36 - 46 %    MCV 95.3 80 - 100 fL    MCH 32.8 26 - 34 pg    MCHC 34.5 31 - 37 g/dL    RDW 13.3 11.5 - 14.9 %    Platelets 819 005 - 457 k/uL    MPV 6.4 6.0 - 12.0 fL    Differential Type NOT REPORTED     Seg Neutrophils 54 %    Lymphocytes 28 %    Monocytes 14 %    Eosinophils % 3 %    Basophils 1 %    Immature Granulocytes NOT REPORTED 0 %    Segs Absolute 3.40 1.3 - 9.1 k/uL    Absolute Lymph # 1.70 1.0 - 4.8 k/uL    Absolute Mono # 0.90 0.1 - 1.3 k/uL    Absolute Eos # 0.20 0.0 - 0.4 k/uL    Basophils # 0.00 0.0 - 0.2 k/uL    Absolute Immature Granulocyte NOT REPORTED 0.00 - 0.30 k/uL    WBC Morphology NOT REPORTED     RBC Morphology NOT REPORTED     Platelet Estimate NOT REPORTED    EKG 12 lead   Result Value Ref Range    Ventricular Rate 69 BPM    Atrial Rate 69 BPM    P-R Interval 176 ms    QRS Duration 86 ms    Q-T Interval 428 ms    QTc Calculation (Bazett) 458 ms    P Axis 58 degrees    R Axis -8 degrees    T Axis 55 degrees                    Imaging studies reviewed ;       Current Facility-Administered Medications   Medication Dose Route Frequency Provider Last Rate Last Dose    albuterol sulfate  (90 Base) MCG/ACT inhaler 2 puff  2 puff Inhalation Q6H PRN Fabian Posadas MD        clonazePAM (KLONOPIN) tablet 1 mg  1 mg Oral TID Fabian Posadas MD   1 mg at 11/20/17 1334    sodium chloride flush 0.9 % injection 10 mL  10 mL Intravenous PRN Didier Ruiz MD   10 mL at 11/20/17 1112    sucralfate (CARAFATE) tablet 1 g  1 g Oral TID AC Didier Ruiz MD   1 g at 11/20/17 1201    acetaminophen (TYLENOL) tablet 325 mg  325 mg Oral Q8H PRN Fabian Posadas MD   325 mg at 11/20/17 0622    magnesium hydroxide (MILK OF MAGNESIA) 400 MG/5ML suspension 30 mL  30 mL Oral Daily PRN Fabian Posadas MD        hydrOXYzine (ATARAX) tablet 25 mg  25 mg Oral TID PRN Fabian Posadas MD   25 mg at 11/20/17 0912    nicotine (NICODERM CQ) 14 MG/24HR 1 patch  1 patch Transdermal Daily Fabian Posadas MD   1 patch at 11/20/17 0912    QUEtiapine (SEROQUEL) tablet 400 mg  400 mg Oral Nightly Fabian Posadas MD   400 mg at 11/19/17 2119    doxepin (SINEQUAN) capsule 100 mg  100 mg Oral Nightly Didier Ruiz MD   100 mg at 11/19/17 2119    Cariprazine HCl CAPS 1.5 mg  1.5 mg Oral Daily Didier Ruiz MD   1.5 mg at 11/20/17 0912    escitalopram (LEXAPRO) tablet 20 mg  20 mg Oral Daily Didier Ruiz MD   20 mg at 11/20/17 0912    amLODIPine (NORVASC) tablet 10 mg  10 mg Oral Nightly Fabian Posadas MD   10 mg at 11/19/17 2119    fluticasone (FLONASE) 50 MCG/ACT nasal spray 1 spray  1 spray Each Nare Daily Didier Ruiz MD   1 spray at 11/20/17 0912    carBAMazepine (CARBATROL) extended release capsule 300 mg  300 mg Oral BID Didier Ruiz MD   300 mg at 11/20/17 0912    linaclotide (LINZESS) capsule 145 mcg  145 mcg Oral QAM AC Didier Ruiz MD   145 mcg at 11/20/17 0622    ondansetron (ZOFRAN) tablet 4 mg  4 mg Oral Q8H PRN Fabian Posadas MD        hydrochlorothiazide

## 2017-11-20 NOTE — CARE COORDINATION
Problem: Altered Mood, Depressive Behavior  Goal: LTG-Able to verbalize and/or display a decrease in depressive symptoms  Outcome: Ongoing   PSYCHOTHERAPY GROUP NOTE        Date:      11/20/2018            Start Time:    10:00am                   End Time:     10:50am        Number Participants in Group: 8/10        Goals: To participate in group psychotherapy and remain in the here-and-now. RT X SW   Nsg   LPN    BHTII   Other         Participation Level:                   None   Minimal   X Active Listener X Interactive     Monopolizing             Participation Quality:  X Appropriate   Inappropriate   X  Attentive    Intrusive   X  Sharing    Resistant      Supportive     Lethargic         Affective:            Congruent   Incongruent   Blunted   Flat     Constricted X Anxious   Elated   Angry     Labile   Depressed   Other             Cognitive:  X Alert   Oriented PPTS      Concentration G X F   P     Attention Span G   F X P     Short-Term Memory G X F   P     Long-Term Memory G X F   P     ProblemSolving/  Decision Making G   F X P     Ability to Process  Information G X F   P          Contributing Factors              Delusional              Hallucinating              Flight of Ideas              Other: poor concentration         Modes of Intervention:  X Education X Support   Exploration   X Clarifying X Problem Solving   Confrontation   X Socialization X Limit Setting   Reality Testing     Activity   Movement   Media     Other:               Response to Learning:    Able to verbalize current knowledge/experience     Able to verbalize/acknowledge new learning     Able to retain information     Capable of insight     Able to change behavior   X Progressing to goal     Other:          Comments: PT participates in group psychotherapy.

## 2017-11-20 NOTE — PROGRESS NOTES
PSYCHOEDUCATION GROUP NOTE    Date:   11/20/2017 Start Time: 1100  End Time: 2611    Number Participants in Group:  6    Goal:  Patient will demonstrate increased interpersonal interaction   Topic: skills group    Discipline Responsible:   OT  AT  [facility]. X RT MHP Other       Participation Level:     None  Minimal   X Active Listener X Interactive    Monopolizing         Participation Quality:  X Appropriate  Inappropriate   X       Attentive        Intrusive   X       Sharing        Resistant   X       Supportive        Lethargic       Affective:    Congruent  Incongruent  Blunted  Flat    Constricted  Anxious  Elated  Angry    Labile  Depressed  Other x bright       Cognitive:  X Alert X Oriented PPTP     Concentration X G  F  P   Attention Span X G  F  P   Short-Term Memory  G  F  P   Long-Term Memory  G  F  P   ProblemSolving/  Decision Making X G  F  P   Ability to Process  Information X G  F  P      Contributing Factors             Delusional             Hallucinating             Flight of Ideas             Other:       Modes of Intervention:  x Education x Support x Exploration    Clarifying x Problem Solving  Confrontation   x Socialization  Limit Setting  Reality Testing   x Activity  Movement  Media    Other:            Response to Learning:  X Able to verbalize current knowledge/experience   X Able to verbalize/acknowledge new learning   X Able to retain information   X Capable of insight    Able to change behavior   X Progressing to goal    Other:         Comments:

## 2017-11-20 NOTE — PLAN OF CARE
Problem: Altered Mood, Depressive Behavior  Goal: STG-Able to verbalize suicidal ideations  Pt denies and thoughts of suicide at this time.

## 2017-11-21 PROCEDURE — 6370000000 HC RX 637 (ALT 250 FOR IP): Performed by: PSYCHIATRY & NEUROLOGY

## 2017-11-21 PROCEDURE — 1240000000 HC EMOTIONAL WELLNESS R&B

## 2017-11-21 RX ADMIN — SUCRALFATE 1 G: 1 TABLET ORAL at 12:09

## 2017-11-21 RX ADMIN — HYDROXYZINE HYDROCHLORIDE 25 MG: 25 TABLET, FILM COATED ORAL at 21:05

## 2017-11-21 RX ADMIN — HYDRALAZINE HYDROCHLORIDE 50 MG: 50 TABLET, FILM COATED ORAL at 21:05

## 2017-11-21 RX ADMIN — METOPROLOL TARTRATE 50 MG: 50 TABLET ORAL at 21:05

## 2017-11-21 RX ADMIN — FLUTICASONE PROPIONATE 1 SPRAY: 50 SPRAY, METERED NASAL at 09:10

## 2017-11-21 RX ADMIN — ATORVASTATIN CALCIUM 10 MG: 10 TABLET, FILM COATED ORAL at 21:05

## 2017-11-21 RX ADMIN — CLONAZEPAM 1 MG: 1 TABLET ORAL at 21:05

## 2017-11-21 RX ADMIN — GABAPENTIN 400 MG: 400 CAPSULE ORAL at 21:05

## 2017-11-21 RX ADMIN — CARIPRAZINE 1.5 MG: 1.5 CAPSULE, GELATIN COATED ORAL at 09:06

## 2017-11-21 RX ADMIN — GABAPENTIN 400 MG: 400 CAPSULE ORAL at 09:06

## 2017-11-21 RX ADMIN — QUETIAPINE FUMARATE 400 MG: 200 TABLET ORAL at 21:05

## 2017-11-21 RX ADMIN — MAGNESIUM HYDROXIDE 30 ML: 400 SUSPENSION ORAL at 15:42

## 2017-11-21 RX ADMIN — CARBAMAZEPINE 300 MG: 300 CAPSULE, EXTENDED RELEASE ORAL at 09:06

## 2017-11-21 RX ADMIN — CARBAMAZEPINE 300 MG: 300 CAPSULE, EXTENDED RELEASE ORAL at 21:05

## 2017-11-21 RX ADMIN — CLONAZEPAM 1 MG: 1 TABLET ORAL at 14:13

## 2017-11-21 RX ADMIN — DOXEPIN HYDROCHLORIDE 100 MG: 50 CAPSULE ORAL at 21:05

## 2017-11-21 RX ADMIN — HYDROXYZINE HYDROCHLORIDE 25 MG: 25 TABLET, FILM COATED ORAL at 15:43

## 2017-11-21 RX ADMIN — HYDRALAZINE HYDROCHLORIDE 50 MG: 50 TABLET, FILM COATED ORAL at 11:12

## 2017-11-21 RX ADMIN — LINACLOTIDE 145 MCG: 145 CAPSULE, GELATIN COATED ORAL at 06:11

## 2017-11-21 RX ADMIN — HYDROCHLOROTHIAZIDE 25 MG: 25 TABLET ORAL at 09:06

## 2017-11-21 RX ADMIN — AMLODIPINE BESYLATE 10 MG: 10 TABLET ORAL at 21:05

## 2017-11-21 RX ADMIN — METOPROLOL TARTRATE 50 MG: 50 TABLET ORAL at 09:06

## 2017-11-21 RX ADMIN — CLONAZEPAM 1 MG: 1 TABLET ORAL at 09:07

## 2017-11-21 RX ADMIN — SUCRALFATE 1 G: 1 TABLET ORAL at 06:11

## 2017-11-21 RX ADMIN — ESCITALOPRAM OXALATE 20 MG: 20 TABLET ORAL at 09:06

## 2017-11-21 RX ADMIN — POLYETHYLENE GLYCOL 3350 17 G: 17 POWDER, FOR SOLUTION ORAL at 09:07

## 2017-11-21 RX ADMIN — ACETAMINOPHEN 325 MG: 325 TABLET, FILM COATED ORAL at 11:30

## 2017-11-21 ASSESSMENT — PAIN SCALES - GENERAL
PAINLEVEL_OUTOF10: 6
PAINLEVEL_OUTOF10: 2
PAINLEVEL_OUTOF10: 3

## 2017-11-21 ASSESSMENT — PAIN DESCRIPTION - PAIN TYPE: TYPE: ACUTE PAIN

## 2017-11-21 ASSESSMENT — PAIN DESCRIPTION - LOCATION: LOCATION: ABDOMEN

## 2017-11-21 NOTE — PROGRESS NOTES
PSYCHOEDUCATION GROUP NOTE    Date:  11/21/2017  Start Time: 33360  End Time: 1869    Number Participants in Group:  11    Goal:  Patient will demonstrate increased interpersonal interaction   Topic: communication skills group    Discipline Responsible:   OT  AT  [facility]. X RT MHP Other       Participation Level:     None  Minimal   X Active Listener X Interactive    Monopolizing         Participation Quality:  X Appropriate  Inappropriate   X       Attentive        Intrusive   X       Sharing        Resistant   X       Supportive        Lethargic       Affective:    Congruent  Incongruent  Blunted  Flat    Constricted  Anxious  Elated  Angry    Labile  Depressed  Other x bright       Cognitive:  X Alert X Oriented PPTP     Concentration X G  F  P   Attention Span X G  F  P   Short-Term Memory  G  F  P   Long-Term Memory  G  F  P   ProblemSolving/  Decision Making X G  F  P   Ability to Process  Information X G  F  P      Contributing Factors             Delusional             Hallucinating             Flight of Ideas             Other:       Modes of Intervention:  x Education x Support x Exploration    Clarifying x Problem Solving  Confrontation   x Socialization  Limit Setting  Reality Testing   x Activity  Movement  Media    Other:            Response to Learning:  X Able to verbalize current knowledge/experience   X Able to verbalize/acknowledge new learning   X Able to retain information   X Capable of insight    Able to change behavior   X Progressing to goal    Other:        Comments:

## 2017-11-21 NOTE — PLAN OF CARE
Problem: Altered Mood, Depressive Behavior  Goal: LTG-Able to verbalize and/or display a decrease in depressive symptoms  Outcome: Ongoing   PSYCHOTHERAPY GROUP NOTE       Date:     11/21/2017             Start Time:       10:00am                  End Time:     10:45am      Number Participants in Group: 7/11      Goals: To participate in group psychotherapy and remain in the here-and-now        RT X SW  Nsg  LPN   BHTII  Other       Participation Level:     None  Minimal   X Active Listener X Interactive    Monopolizing         Participation Quality:  X Appropriate  Inappropriate   X  Attentive   Intrusive   X  Sharing   Resistant   X  Supportive    Lethargic       Affective:    Congruent  Incongruent  Blunted  Flat    Constricted X Anxious  Elated  Angry    Labile  Depressed  Other         Cognitive:  X Alert  Oriented PPTS     Concentration G X F  P    Attention Span G  F X P    Short-Term Memory G  F X P    Long-Term Memory G X F  P    ProblemSolving/  Decision Making G  F X P    Ability to Process  Information G  F X P       Contributing Factors             Delusional             Hallucinating             Flight of Ideas   X          Other: poor concentration       Modes of Intervention:   Education X Support XX Exploration   X Clarifying X Problem Solving  Confrontation   X Socialization X Limit Setting  Reality Testing    Activity  Movement  Media    Other:          Response to Learning:  X Able to verbalize current knowledge/experience    Able to verbalize/acknowledge new learning    Able to retain information    Capable of insight    Able to change behavior   X Progressing to goal    Other:        Comments:  Patient participated in group psychotherapy.

## 2017-11-22 PROCEDURE — 6360000002 HC RX W HCPCS: Performed by: PSYCHIATRY & NEUROLOGY

## 2017-11-22 PROCEDURE — 6370000000 HC RX 637 (ALT 250 FOR IP): Performed by: PSYCHIATRY & NEUROLOGY

## 2017-11-22 PROCEDURE — 1240000000 HC EMOTIONAL WELLNESS R&B

## 2017-11-22 RX ORDER — CHLORPROMAZINE HYDROCHLORIDE 25 MG/1
50 TABLET, FILM COATED ORAL NIGHTLY PRN
Status: DISCONTINUED | OUTPATIENT
Start: 2017-11-22 | End: 2017-11-22

## 2017-11-22 RX ORDER — DIPHENHYDRAMINE HCL 25 MG
50 TABLET ORAL EVERY 6 HOURS PRN
Status: DISCONTINUED | OUTPATIENT
Start: 2017-11-22 | End: 2017-12-01 | Stop reason: HOSPADM

## 2017-11-22 RX ORDER — PRAZOSIN HYDROCHLORIDE 1 MG/1
1 CAPSULE ORAL 2 TIMES DAILY
Status: DISCONTINUED | OUTPATIENT
Start: 2017-11-22 | End: 2017-11-26

## 2017-11-22 RX ADMIN — PRAZOSIN HYDROCHLORIDE 1 MG: 1 CAPSULE ORAL at 12:43

## 2017-11-22 RX ADMIN — SUCRALFATE 1 G: 1 TABLET ORAL at 06:01

## 2017-11-22 RX ADMIN — ALBUTEROL SULFATE 2 PUFF: 90 AEROSOL, METERED RESPIRATORY (INHALATION) at 10:01

## 2017-11-22 RX ADMIN — METOPROLOL TARTRATE 50 MG: 50 TABLET ORAL at 21:24

## 2017-11-22 RX ADMIN — ATORVASTATIN CALCIUM 10 MG: 10 TABLET, FILM COATED ORAL at 21:23

## 2017-11-22 RX ADMIN — CHLORPROMAZINE HYDROCHLORIDE 50 MG: 25 TABLET, SUGAR COATED ORAL at 00:48

## 2017-11-22 RX ADMIN — CLONAZEPAM 1 MG: 1 TABLET ORAL at 08:32

## 2017-11-22 RX ADMIN — SUCRALFATE 1 G: 1 TABLET ORAL at 11:23

## 2017-11-22 RX ADMIN — ESCITALOPRAM OXALATE 20 MG: 20 TABLET ORAL at 08:32

## 2017-11-22 RX ADMIN — HYDROCHLOROTHIAZIDE 25 MG: 25 TABLET ORAL at 08:32

## 2017-11-22 RX ADMIN — QUETIAPINE FUMARATE 400 MG: 200 TABLET ORAL at 21:24

## 2017-11-22 RX ADMIN — AMLODIPINE BESYLATE 10 MG: 10 TABLET ORAL at 21:23

## 2017-11-22 RX ADMIN — HYDROXYZINE HYDROCHLORIDE 25 MG: 25 TABLET, FILM COATED ORAL at 21:24

## 2017-11-22 RX ADMIN — FLUTICASONE PROPIONATE 1 SPRAY: 50 SPRAY, METERED NASAL at 08:33

## 2017-11-22 RX ADMIN — CARBAMAZEPINE 300 MG: 300 CAPSULE, EXTENDED RELEASE ORAL at 21:23

## 2017-11-22 RX ADMIN — CLONAZEPAM 1 MG: 1 TABLET ORAL at 14:16

## 2017-11-22 RX ADMIN — CARBAMAZEPINE 300 MG: 300 CAPSULE, EXTENDED RELEASE ORAL at 08:34

## 2017-11-22 RX ADMIN — CLONAZEPAM 1 MG: 1 TABLET ORAL at 21:23

## 2017-11-22 RX ADMIN — DOXEPIN HYDROCHLORIDE 100 MG: 50 CAPSULE ORAL at 21:24

## 2017-11-22 RX ADMIN — GABAPENTIN 400 MG: 400 CAPSULE ORAL at 21:24

## 2017-11-22 RX ADMIN — CARIPRAZINE 1.5 MG: 1.5 CAPSULE, GELATIN COATED ORAL at 08:33

## 2017-11-22 RX ADMIN — ACETAMINOPHEN 325 MG: 325 TABLET, FILM COATED ORAL at 04:18

## 2017-11-22 RX ADMIN — POLYETHYLENE GLYCOL 3350 17 G: 17 POWDER, FOR SOLUTION ORAL at 08:32

## 2017-11-22 RX ADMIN — METOPROLOL TARTRATE 50 MG: 50 TABLET ORAL at 08:32

## 2017-11-22 RX ADMIN — HYDRALAZINE HYDROCHLORIDE 50 MG: 50 TABLET, FILM COATED ORAL at 21:23

## 2017-11-22 RX ADMIN — MAGNESIUM HYDROXIDE 30 ML: 400 SUSPENSION ORAL at 06:04

## 2017-11-22 RX ADMIN — ACETAMINOPHEN 325 MG: 325 TABLET, FILM COATED ORAL at 15:15

## 2017-11-22 RX ADMIN — HYDRALAZINE HYDROCHLORIDE 50 MG: 50 TABLET, FILM COATED ORAL at 10:01

## 2017-11-22 RX ADMIN — GABAPENTIN 400 MG: 400 CAPSULE ORAL at 08:32

## 2017-11-22 RX ADMIN — SUCRALFATE 1 G: 1 TABLET ORAL at 16:03

## 2017-11-22 RX ADMIN — LINACLOTIDE 145 MCG: 145 CAPSULE, GELATIN COATED ORAL at 06:01

## 2017-11-22 RX ADMIN — PRAZOSIN HYDROCHLORIDE 1 MG: 1 CAPSULE ORAL at 21:24

## 2017-11-22 ASSESSMENT — PAIN DESCRIPTION - PAIN TYPE: TYPE: ACUTE PAIN

## 2017-11-22 ASSESSMENT — PAIN DESCRIPTION - LOCATION: LOCATION: HEAD

## 2017-11-22 ASSESSMENT — PAIN SCALES - GENERAL
PAINLEVEL_OUTOF10: 0
PAINLEVEL_OUTOF10: 3
PAINLEVEL_OUTOF10: 0
PAINLEVEL_OUTOF10: 3

## 2017-11-22 ASSESSMENT — PAIN DESCRIPTION - FREQUENCY: FREQUENCY: CONTINUOUS

## 2017-11-22 ASSESSMENT — PAIN DESCRIPTION - DESCRIPTORS: DESCRIPTORS: ACHING;HEADACHE

## 2017-11-22 ASSESSMENT — PAIN DESCRIPTION - ONSET: ONSET: GRADUAL

## 2017-11-22 ASSESSMENT — PAIN DESCRIPTION - PROGRESSION: CLINICAL_PROGRESSION: NOT CHANGED

## 2017-11-22 NOTE — PLAN OF CARE
Problem: Altered Mood, Depressive Behavior  Goal: STG-Able to verbalize suicidal ideations  Outcome: Ongoing  Pt denies SI at this time. Goal: LTG-Absence of self-harm  Outcome: Ongoing  Safe environment maintained & pt remains free from self-harm. Agreeable to seeking staff should thoughts to harm self or others arise. Q15min checks for safety.

## 2017-11-22 NOTE — PROGRESS NOTES
Pharmacy Med Education Group Note    Date: 11/22/2017  Start Time: 1430  End Time: 1500    Number Participants in Group:  8    Goal:  Patient will demonstrate an understanding of the medications intended purpose and possible adverse effects  Topic: [facility] for Pharmacy Med Ed Group    Discipline Responsible:     OT  AT  [facility].  RT     X Other       Participation Level:     None  Minimal      X Active Listener    X Interactive    Monopolizing         Participation Quality:    X Appropriate  Inappropriate     X       Attentive        Intrusive          Sharing        Resistant          Supportive        Lethargic       Affective:     X Congruent  Incongruent  Blunted  Flat    Constricted  Anxious  Elated  Angry    Labile  Depressed  Other         Cognitive:    X Alert  Oriented PPTP     Concentration   X G  F  P   Attention Span   X G  F  P   Short-Term Memory   X G  F  P   Long-Term Memory  G  F  P   ProblemSolving/  Decision Making  G  F  P   Ability to Process  Information   X G  F  P      Contributing Factors             Delusional             Hallucinating             Flight of Ideas             Other:       Modes of Intervention:    X Education   X Support  Exploration    Clarifying  Problem Solving  Confrontation    Socialization  Limit Setting  Reality Testing    Activity  Movement  Media    Other:            Response to Learning:    X Able to verbalize current knowledge/experience    Able to verbalize/acknowledge new learning    Able to retain information    Capable of insight    Able to change behavior    Progressing to goal    Other:        Comments:   Patient interacted appropriately and asked appropriate questions. Raechel C. Sallye Bence. D. 11/22/2017 3:14 PM

## 2017-11-22 NOTE — PLAN OF CARE
Problem: Altered Mood, Depressive Behavior  Goal: LTG-Absence of self-harm  Outcome: Ongoing   PSYCHOTHERAPY GROUP NOTE       Date:    11/22/2017              Start Time:      10:00am                   End Time:   10:45am    Number Participants in Group: 4/9    Goals: To participate in group psychotherapy and remain in the here-and-now      RT X SW  Nsg  LPN   BHTII  Other       Participation Level:     None  Minimal   X Active Listener X Interactive    Monopolizing         Participation Quality:  X Appropriate  Inappropriate   X  Attentive   Intrusive   X  Sharing   Resistant   X  Supportive    Lethargic       Affective:    Congruent  Incongruent  Blunted  Flat    Constricted X Anxious  Elated  Angry    Labile X Depressed  Other         Cognitive:  X Alert  Oriented PPTS     Concentration G  F X P    Attention Span G  F X P    Short-Term Memory G  F X P    Long-Term Memory G  F X P    ProblemSolving/  Decision Making G X F  P    Ability to Process  Information G X F  P       Contributing Factors             Delusional             Hallucinating             Flight of Ideas   X          Other: poor concentration       Modes of Intervention:  X Education X Support  Exploration   X Clarifying X Problem Solving  Confrontation   X Socialization  Limit Setting  Reality Testing    Activity X Movement  Media    Other:          Response to Learning:  X Able to verbalize current knowledge/experience   X Able to verbalize/acknowledge new learning    Able to retain information    Capable of insight    Able to change behavior   X Progressing to goal    Other:        Comments: PT participates in group psychotherapy.

## 2017-11-23 PROCEDURE — 1240000000 HC EMOTIONAL WELLNESS R&B

## 2017-11-23 PROCEDURE — 6370000000 HC RX 637 (ALT 250 FOR IP): Performed by: PSYCHIATRY & NEUROLOGY

## 2017-11-23 RX ORDER — NICOTINE 21 MG/24HR
1 PATCH, TRANSDERMAL 24 HOURS TRANSDERMAL DAILY
Status: DISCONTINUED | OUTPATIENT
Start: 2017-11-23 | End: 2017-11-29 | Stop reason: DRUGHIGH

## 2017-11-23 RX ADMIN — METOPROLOL TARTRATE 50 MG: 50 TABLET ORAL at 21:11

## 2017-11-23 RX ADMIN — HYDROXYZINE HYDROCHLORIDE 25 MG: 25 TABLET, FILM COATED ORAL at 21:12

## 2017-11-23 RX ADMIN — HYDRALAZINE HYDROCHLORIDE 50 MG: 50 TABLET, FILM COATED ORAL at 21:13

## 2017-11-23 RX ADMIN — MAGNESIUM HYDROXIDE 30 ML: 400 SUSPENSION ORAL at 07:04

## 2017-11-23 RX ADMIN — CARBAMAZEPINE 300 MG: 300 CAPSULE, EXTENDED RELEASE ORAL at 21:17

## 2017-11-23 RX ADMIN — HYDROXYZINE HYDROCHLORIDE 25 MG: 25 TABLET, FILM COATED ORAL at 12:21

## 2017-11-23 RX ADMIN — ESCITALOPRAM OXALATE 20 MG: 20 TABLET ORAL at 08:31

## 2017-11-23 RX ADMIN — SUCRALFATE 1 G: 1 TABLET ORAL at 06:49

## 2017-11-23 RX ADMIN — CARIPRAZINE 3 MG: 1.5 CAPSULE, GELATIN COATED ORAL at 08:31

## 2017-11-23 RX ADMIN — CARBAMAZEPINE 300 MG: 300 CAPSULE, EXTENDED RELEASE ORAL at 08:36

## 2017-11-23 RX ADMIN — GABAPENTIN 400 MG: 400 CAPSULE ORAL at 21:12

## 2017-11-23 RX ADMIN — DIPHENHYDRAMINE HCL 50 MG: 25 TABLET ORAL at 01:22

## 2017-11-23 RX ADMIN — DOXEPIN HYDROCHLORIDE 100 MG: 50 CAPSULE ORAL at 21:14

## 2017-11-23 RX ADMIN — AMLODIPINE BESYLATE 10 MG: 10 TABLET ORAL at 21:12

## 2017-11-23 RX ADMIN — HYDRALAZINE HYDROCHLORIDE 50 MG: 50 TABLET, FILM COATED ORAL at 08:36

## 2017-11-23 RX ADMIN — ATORVASTATIN CALCIUM 10 MG: 10 TABLET, FILM COATED ORAL at 21:12

## 2017-11-23 RX ADMIN — QUETIAPINE FUMARATE 400 MG: 200 TABLET ORAL at 21:12

## 2017-11-23 RX ADMIN — FLUTICASONE PROPIONATE 1 SPRAY: 50 SPRAY, METERED NASAL at 08:30

## 2017-11-23 RX ADMIN — CLONAZEPAM 1 MG: 1 TABLET ORAL at 13:20

## 2017-11-23 RX ADMIN — GABAPENTIN 400 MG: 400 CAPSULE ORAL at 08:31

## 2017-11-23 RX ADMIN — HYDROCHLOROTHIAZIDE 25 MG: 25 TABLET ORAL at 08:31

## 2017-11-23 RX ADMIN — CLONAZEPAM 1 MG: 1 TABLET ORAL at 08:31

## 2017-11-23 RX ADMIN — PRAZOSIN HYDROCHLORIDE 1 MG: 1 CAPSULE ORAL at 21:13

## 2017-11-23 RX ADMIN — CLONAZEPAM 1 MG: 1 TABLET ORAL at 21:12

## 2017-11-23 RX ADMIN — SUCRALFATE 1 G: 1 TABLET ORAL at 16:28

## 2017-11-23 RX ADMIN — ALBUTEROL SULFATE 2 PUFF: 90 AEROSOL, METERED RESPIRATORY (INHALATION) at 08:39

## 2017-11-23 RX ADMIN — PRAZOSIN HYDROCHLORIDE 1 MG: 1 CAPSULE ORAL at 08:30

## 2017-11-23 RX ADMIN — LINACLOTIDE 145 MCG: 145 CAPSULE, GELATIN COATED ORAL at 06:49

## 2017-11-23 RX ADMIN — Medication 5 MG: at 01:21

## 2017-11-23 RX ADMIN — SUCRALFATE 1 G: 1 TABLET ORAL at 11:49

## 2017-11-23 RX ADMIN — DIPHENHYDRAMINE HCL 50 MG: 25 TABLET ORAL at 21:12

## 2017-11-23 RX ADMIN — METOPROLOL TARTRATE 50 MG: 50 TABLET ORAL at 08:31

## 2017-11-23 ASSESSMENT — PAIN SCALES - GENERAL: PAINLEVEL_OUTOF10: 5

## 2017-11-23 NOTE — PLAN OF CARE
Problem: Altered Mood, Depressive Behavior  Goal: LTG-Absence of self-harm  Outcome: Ongoing  Psychoeducation Group Note    Date: 11/23/2017  Start Time: 0845  End Time: 0905    Number Participants in Group:  9    Goal:  Patient will demonstrate increased interpersonal interaction   Topic: Community meeting/ goals group    Discipline Responsible:   OT  AT  [facility]. x RT  Other       Participation Level:     None  Minimal   x Active Listener x Interactive    Monopolizing         Participation Quality:  x Appropriate  Inappropriate   x       Attentive        Intrusive   x       Sharing        Resistant          Supportive        Lethargic       Affective:    Congruent  Incongruent  Blunted  Flat   x Constricted  Anxious  Elated  Angry    Labile  Depressed  Other         Cognitive:  x Alert x Oriented PPTP     Concentration  G x F  P   Attention Span  G x F  P   Short-Term Memory  G x F  P   Long-Term Memory  G x F  P   ProblemSolving/  Decision Making  G x F  P   Ability to Process  Information  G x F  P      Contributing Factors             Delusional             Hallucinating             Flight of Ideas             Other:       Modes of Intervention:  x Education x Support x Exploration   x Clarifying x Problem Solving  Confrontation   x Socialization  Limit Setting x Reality Testing   x Activity  Movement  Media    Other:            Response to Learning:   Able to verbalize current knowledge/experience    Able to verbalize/acknowledge new learning    Able to retain information    Capable of insight    Able to change behavior   x Progressing to goal    Other:        Comments:

## 2017-11-23 NOTE — CARE COORDINATION
Problem: Altered Mood, Depressive Behavior  Goal: STG-Able to verbalize support system  Outcome: Ongoing   PSYCHOTHERAPY GROUP NOTE        Date:    11/23/2017              Start Time:           10:00am              End Time:    10:50am        Number Participants in Group: 10/18        Goals:  To participates in group psychotherapy and remain in the here-and-now       RT X SW   Nsg   LPN    BHTII   Other         Participation Level:                   None   Minimal   X Active Listener X Interactive     Monopolizing             Participation Quality:  X Appropriate   Inappropriate   X  Attentive    Intrusive   X  Sharing    Resistant   X  Supportive     Lethargic         Affective:          X Congruent   Incongruent   Blunted   Flat     Constricted X Anxious   Elated   Angry     Labile X Depressed   Other             Cognitive:  X Alert   Oriented PPTS      Concentration G  X F  P     Attention Span G  X F  P     Short-Term Memory G  X F  P     Long-Term Memory G  X F  P     ProblemSolving/  Decision Making G  X F  P     Ability to Process  Information G  X F   P         Contributing Factors              Delusional              Hallucinating              Flight of Ideas             Other: poor concentration         Modes of Intervention:  X Education X Support X Exploration   X Clarifying X Problem Solving   Confrontation   X Socialization X Limit Setting   Reality Testing     Activity   Movement   Media     Other:               Response to Learning:   X Able to verbalize current knowledge/experience    X Able to verbalize/acknowledge new learning    X Able to retain information     Capable of insight     Able to change behavior   X Progressing to goal     Other:          Comments:  PT participates in group psychotherapy

## 2017-11-23 NOTE — PLAN OF CARE
Problem: Altered Mood, Depressive Behavior  Goal: STG-Able to verbalize suicidal ideations  Outcome: Ongoing  Pt denies SI

## 2017-11-23 NOTE — PLAN OF CARE
Problem: Altered Mood, Depressive Behavior  Goal: LTG-Absence of self-harm  Outcome: Ongoing  Pt did not participate in leisure/ cognitive skills group at 1330 despite staff encouragement to attend.

## 2017-11-23 NOTE — PLAN OF CARE
Problem: Altered Mood, Depressive Behavior  Goal: STG-Able to verbalize suicidal ideations  Outcome: Ongoing  Pt denies any suicidal ideation at this time. Goal: LTG-Absence of self-harm  Outcome: Ongoing  Pt denied thoughts of SI/HI/self-harm and agreed to seek out staff should thoughts of SI/HI/self-harm arise. Safe environment maintained. Q15 minute checks for safety continued per unit policy. Will continue to monitor for safety and provide support and reassurance as needed.

## 2017-11-24 PROCEDURE — 1240000000 HC EMOTIONAL WELLNESS R&B

## 2017-11-24 PROCEDURE — 6370000000 HC RX 637 (ALT 250 FOR IP): Performed by: PSYCHIATRY & NEUROLOGY

## 2017-11-24 PROCEDURE — 6360000002 HC RX W HCPCS: Performed by: PSYCHIATRY & NEUROLOGY

## 2017-11-24 RX ORDER — FLUTICASONE PROPIONATE 50 MCG
1 SPRAY, SUSPENSION (ML) NASAL 2 TIMES DAILY
Status: DISCONTINUED | OUTPATIENT
Start: 2017-11-24 | End: 2017-12-01 | Stop reason: HOSPADM

## 2017-11-24 RX ORDER — CHLORPROMAZINE HYDROCHLORIDE 100 MG/1
100 TABLET, FILM COATED ORAL NIGHTLY
Status: DISCONTINUED | OUTPATIENT
Start: 2017-11-24 | End: 2017-11-26

## 2017-11-24 RX ADMIN — SUCRALFATE 1 G: 1 TABLET ORAL at 11:32

## 2017-11-24 RX ADMIN — HYDROCHLOROTHIAZIDE 25 MG: 25 TABLET ORAL at 08:47

## 2017-11-24 RX ADMIN — DOXEPIN HYDROCHLORIDE 100 MG: 50 CAPSULE ORAL at 21:00

## 2017-11-24 RX ADMIN — LINACLOTIDE 145 MCG: 145 CAPSULE, GELATIN COATED ORAL at 06:50

## 2017-11-24 RX ADMIN — FLUTICASONE PROPIONATE 1 SPRAY: 50 SPRAY, METERED NASAL at 08:47

## 2017-11-24 RX ADMIN — ACETAMINOPHEN 325 MG: 325 TABLET, FILM COATED ORAL at 05:08

## 2017-11-24 RX ADMIN — CLONAZEPAM 1 MG: 1 TABLET ORAL at 08:47

## 2017-11-24 RX ADMIN — QUETIAPINE FUMARATE 400 MG: 200 TABLET ORAL at 20:59

## 2017-11-24 RX ADMIN — CARBAMAZEPINE 300 MG: 300 CAPSULE, EXTENDED RELEASE ORAL at 21:01

## 2017-11-24 RX ADMIN — ACETAMINOPHEN 325 MG: 325 TABLET, FILM COATED ORAL at 20:59

## 2017-11-24 RX ADMIN — ATORVASTATIN CALCIUM 10 MG: 10 TABLET, FILM COATED ORAL at 20:59

## 2017-11-24 RX ADMIN — CARBAMAZEPINE 300 MG: 300 CAPSULE, EXTENDED RELEASE ORAL at 08:46

## 2017-11-24 RX ADMIN — GABAPENTIN 400 MG: 400 CAPSULE ORAL at 21:00

## 2017-11-24 RX ADMIN — CLONAZEPAM 1 MG: 1 TABLET ORAL at 14:08

## 2017-11-24 RX ADMIN — GABAPENTIN 400 MG: 400 CAPSULE ORAL at 08:47

## 2017-11-24 RX ADMIN — PRAZOSIN HYDROCHLORIDE 1 MG: 1 CAPSULE ORAL at 08:46

## 2017-11-24 RX ADMIN — HYDRALAZINE HYDROCHLORIDE 50 MG: 50 TABLET, FILM COATED ORAL at 21:01

## 2017-11-24 RX ADMIN — AMLODIPINE BESYLATE 10 MG: 10 TABLET ORAL at 21:00

## 2017-11-24 RX ADMIN — METOPROLOL TARTRATE 50 MG: 50 TABLET ORAL at 20:59

## 2017-11-24 RX ADMIN — ESCITALOPRAM OXALATE 20 MG: 20 TABLET ORAL at 08:47

## 2017-11-24 RX ADMIN — DIPHENHYDRAMINE HCL 50 MG: 25 TABLET ORAL at 21:00

## 2017-11-24 RX ADMIN — METOPROLOL TARTRATE 50 MG: 50 TABLET ORAL at 08:47

## 2017-11-24 RX ADMIN — PRAZOSIN HYDROCHLORIDE 1 MG: 1 CAPSULE ORAL at 20:59

## 2017-11-24 RX ADMIN — HYDRALAZINE HYDROCHLORIDE 50 MG: 50 TABLET, FILM COATED ORAL at 08:47

## 2017-11-24 RX ADMIN — CLONAZEPAM 1 MG: 1 TABLET ORAL at 20:59

## 2017-11-24 RX ADMIN — CHLORPROMAZINE HYDROCHLORIDE 100 MG: 100 TABLET, SUGAR COATED ORAL at 21:00

## 2017-11-24 RX ADMIN — POLYETHYLENE GLYCOL 3350 17 G: 17 POWDER, FOR SOLUTION ORAL at 08:46

## 2017-11-24 RX ADMIN — SUCRALFATE 1 G: 1 TABLET ORAL at 06:50

## 2017-11-24 RX ADMIN — HYDROXYZINE HYDROCHLORIDE 25 MG: 25 TABLET, FILM COATED ORAL at 18:10

## 2017-11-24 RX ADMIN — FLUTICASONE PROPIONATE 1 SPRAY: 50 SPRAY, METERED NASAL at 20:59

## 2017-11-24 RX ADMIN — CARIPRAZINE 3 MG: 1.5 CAPSULE, GELATIN COATED ORAL at 08:46

## 2017-11-24 RX ADMIN — HYDROXYZINE HYDROCHLORIDE 25 MG: 25 TABLET, FILM COATED ORAL at 05:10

## 2017-11-24 RX ADMIN — MAGNESIUM HYDROXIDE 30 ML: 400 SUSPENSION ORAL at 06:50

## 2017-11-24 RX ADMIN — SUCRALFATE 1 G: 1 TABLET ORAL at 17:03

## 2017-11-24 ASSESSMENT — PAIN SCALES - GENERAL
PAINLEVEL_OUTOF10: 6
PAINLEVEL_OUTOF10: 0
PAINLEVEL_OUTOF10: 0
PAINLEVEL_OUTOF10: 3

## 2017-11-24 NOTE — H&P
HISTORY and [place] DA[facility] [id number]       NAME:  Paula Boston  MRN: [id number]   YOB: 1965   Date: 11/24/2017   Age: 46 y.o. Gender: female     COMPLAINT AND PRESENT HISTORY:      Paula Boston is 46 y.o.,  female, admitted because of depression/ Bipolar Disorder. Pt has suicidal ideation, Pt has plans to overdose. Pt denies any homicidal ideation. Pt has a history of previous suicide attempts by OD. Pt feels hopeless, helpless, worthless, lack of interest, loss of energy. Poor insight. Pt has poor sleep, loss of appetite, poor concentration and memory. No current auditory, visual or tactile hallucinations. Patient denies any current alcohol or substance abuse. Patient lives With her daughter and her daughter's boyfriend's father. Patient is on disability. Pt has been compliant with the psychiatric medications.     DIAGNOSTIC RESULTS   Labs:  U/A:  Lab Results   Component Value Date    COLORU YELLOW 08/25/2016    WBCUA 5 TO 10 08/25/2016    RBCUA 0 TO 2 08/25/2016    MUCUS NOT REPORTED 08/25/2016    BACTERIA FEW 08/25/2016    SPECGRAV 1.012 08/25/2016    LEUKOCYTESUR NEGATIVE 08/25/2016    GLUCOSEU NEGATIVE 08/25/2016    AMORPHOUS NOT REPORTED 08/25/2016       PAST MEDICAL HISTORY     Past Medical History:   Diagnosis Date    Bipolar 1 disorder ([address]     Brain aneurysm     coil stent placement    GERD (gastroesophageal reflux disease)     Hyperlipidemia     Hypertension     Interstitial lung disease [address] [address]     Panic attacks        Pt denies any history of Diabetes mellitus type 2, stroke, heart disease, Cancer, Seizures,Thyroid disease, Kidney Disease, Hepatitis, TB.    SURGICAL HISTORY       Past Surgical History:   Procedure Laterality Date    NASAL SEPTUM SURGERY      PARTIAL HYSTERECTOMY         FAMILY HISTORY       Family History   Problem Relation Age of Onset    Alcohol Abuse Father     Liver Disease Father     Alzheimer's Disease Sister     Alcohol Abuse Brother     Depression Brother        SOCIAL HISTORY       Social History     Social History    Marital status:      Spouse name: N/A    Number of children: 2    Years of education: N/A     Social History Main Topics    Smoking status: Current Every Day Smoker    Smokeless tobacco: Never Used    Alcohol use No    Drug use: No    Sexual activity: Not Asked     Other Topics Concern    None     Social History Narrative    None           REVIEW OF SYSTEMS      Allergies   Allergen Reactions    Morphine        No current facility-administered medications on file prior to encounter. Current Outpatient Prescriptions on File Prior to Encounter   Medication Sig Dispense Refill    atorvastatin (LIPITOR) 10 MG tablet Take 1 tablet by mouth nightly 30 tablet 0    amLODIPine (NORVASC) 10 MG tablet Take 1 tablet by mouth daily 30 tablet 0    fluticasone (FLONASE) 50 MCG/ACT nasal spray 2 sprays by Nasal route daily 1 Bottle 0    linaclotide (LINZESS) 145 MCG capsule Take 1 capsule by mouth every morning (before breakfast) 30 capsule 0                      General health:  Fairly good. No fever or chills. Skin:  No itching, redness or rash. Head, eyes, ears, nose, throat:  No headache, epistaxis, rhinorrhea hearing loss or sore throat. Neck:  No pain, stiffness or masses. Cardiovascular/Respiratory system:  No chest pain, palpitation, shortness of breath, coughing or expectoration. Gastrointestinal tract: No abdominal pain, nausea, vomiting, diarrhea or constipation. Genitourinary:  No burning on micturition. No hesitancy, urgency, frequency or discoloration of urine. Locomotor:  No bone or joint pains. No swelling or deformities. Neuropsychiatric:  See HPI.      GENERAL PHYSICAL EXAM:     Vitals: BP (!) 145/71   Pulse 63   Temp 98.1 °F (36.7 °C) (Oral)   Resp 14   Ht 5' 5\" (1.651 m)   Wt 210 lb (95.3 kg)   SpO2 98%   BMI 34.95 kg/m²

## 2017-11-25 PROCEDURE — 1240000000 HC EMOTIONAL WELLNESS R&B

## 2017-11-25 PROCEDURE — 6360000002 HC RX W HCPCS: Performed by: PSYCHIATRY & NEUROLOGY

## 2017-11-25 PROCEDURE — 6370000000 HC RX 637 (ALT 250 FOR IP): Performed by: PSYCHIATRY & NEUROLOGY

## 2017-11-25 RX ADMIN — CLONAZEPAM 1 MG: 1 TABLET ORAL at 08:24

## 2017-11-25 RX ADMIN — PRAZOSIN HYDROCHLORIDE 1 MG: 1 CAPSULE ORAL at 21:02

## 2017-11-25 RX ADMIN — HYDROXYZINE HYDROCHLORIDE 25 MG: 25 TABLET, FILM COATED ORAL at 08:24

## 2017-11-25 RX ADMIN — HYDROCHLOROTHIAZIDE 25 MG: 25 TABLET ORAL at 08:23

## 2017-11-25 RX ADMIN — HYDRALAZINE HYDROCHLORIDE 50 MG: 50 TABLET, FILM COATED ORAL at 08:23

## 2017-11-25 RX ADMIN — METOPROLOL TARTRATE 50 MG: 50 TABLET ORAL at 08:23

## 2017-11-25 RX ADMIN — CHLORPROMAZINE HYDROCHLORIDE 100 MG: 100 TABLET, SUGAR COATED ORAL at 21:04

## 2017-11-25 RX ADMIN — CARIPRAZINE 3 MG: 1.5 CAPSULE, GELATIN COATED ORAL at 08:23

## 2017-11-25 RX ADMIN — POLYETHYLENE GLYCOL 3350 17 G: 17 POWDER, FOR SOLUTION ORAL at 08:23

## 2017-11-25 RX ADMIN — SUCRALFATE 1 G: 1 TABLET ORAL at 16:51

## 2017-11-25 RX ADMIN — ACETAMINOPHEN 325 MG: 325 TABLET, FILM COATED ORAL at 14:34

## 2017-11-25 RX ADMIN — DIPHENHYDRAMINE HCL 50 MG: 25 TABLET ORAL at 21:02

## 2017-11-25 RX ADMIN — ALBUTEROL SULFATE 2 PUFF: 90 AEROSOL, METERED RESPIRATORY (INHALATION) at 09:56

## 2017-11-25 RX ADMIN — HYDROXYZINE HYDROCHLORIDE 25 MG: 25 TABLET, FILM COATED ORAL at 14:34

## 2017-11-25 RX ADMIN — ACETAMINOPHEN 325 MG: 325 TABLET, FILM COATED ORAL at 04:58

## 2017-11-25 RX ADMIN — PRAZOSIN HYDROCHLORIDE 1 MG: 1 CAPSULE ORAL at 08:23

## 2017-11-25 RX ADMIN — METOPROLOL TARTRATE 50 MG: 50 TABLET ORAL at 21:04

## 2017-11-25 RX ADMIN — FLUTICASONE PROPIONATE 1 SPRAY: 50 SPRAY, METERED NASAL at 21:01

## 2017-11-25 RX ADMIN — DOXEPIN HYDROCHLORIDE 100 MG: 50 CAPSULE ORAL at 21:03

## 2017-11-25 RX ADMIN — GABAPENTIN 400 MG: 400 CAPSULE ORAL at 21:04

## 2017-11-25 RX ADMIN — HYDRALAZINE HYDROCHLORIDE 50 MG: 50 TABLET, FILM COATED ORAL at 21:02

## 2017-11-25 RX ADMIN — AMLODIPINE BESYLATE 10 MG: 10 TABLET ORAL at 21:04

## 2017-11-25 RX ADMIN — ATORVASTATIN CALCIUM 10 MG: 10 TABLET, FILM COATED ORAL at 21:04

## 2017-11-25 RX ADMIN — SUCRALFATE 1 G: 1 TABLET ORAL at 06:15

## 2017-11-25 RX ADMIN — CLONAZEPAM 1 MG: 1 TABLET ORAL at 13:53

## 2017-11-25 RX ADMIN — CARBAMAZEPINE 300 MG: 300 CAPSULE, EXTENDED RELEASE ORAL at 08:23

## 2017-11-25 RX ADMIN — HYDROXYZINE HYDROCHLORIDE 25 MG: 25 TABLET, FILM COATED ORAL at 21:02

## 2017-11-25 RX ADMIN — SUCRALFATE 1 G: 1 TABLET ORAL at 12:26

## 2017-11-25 RX ADMIN — FLUTICASONE PROPIONATE 1 SPRAY: 50 SPRAY, METERED NASAL at 08:23

## 2017-11-25 RX ADMIN — QUETIAPINE FUMARATE 400 MG: 200 TABLET ORAL at 21:02

## 2017-11-25 RX ADMIN — CARBAMAZEPINE 300 MG: 300 CAPSULE, EXTENDED RELEASE ORAL at 21:04

## 2017-11-25 RX ADMIN — LINACLOTIDE 145 MCG: 145 CAPSULE, GELATIN COATED ORAL at 06:15

## 2017-11-25 RX ADMIN — CLONAZEPAM 1 MG: 1 TABLET ORAL at 21:04

## 2017-11-25 RX ADMIN — ESCITALOPRAM OXALATE 20 MG: 20 TABLET ORAL at 08:23

## 2017-11-25 RX ADMIN — GABAPENTIN 400 MG: 400 CAPSULE ORAL at 08:23

## 2017-11-25 ASSESSMENT — PAIN SCALES - GENERAL
PAINLEVEL_OUTOF10: 3
PAINLEVEL_OUTOF10: 0
PAINLEVEL_OUTOF10: 3
PAINLEVEL_OUTOF10: 0

## 2017-11-25 ASSESSMENT — PAIN DESCRIPTION - LOCATION: LOCATION: HEAD

## 2017-11-25 ASSESSMENT — PAIN DESCRIPTION - PAIN TYPE: TYPE: ACUTE PAIN

## 2017-11-25 ASSESSMENT — PAIN DESCRIPTION - FREQUENCY: FREQUENCY: INTERMITTENT

## 2017-11-25 ASSESSMENT — PAIN DESCRIPTION - ONSET: ONSET: GRADUAL

## 2017-11-25 ASSESSMENT — PAIN DESCRIPTION - DESCRIPTORS: DESCRIPTORS: ACHING;CRAMPING

## 2017-11-25 NOTE — PLAN OF CARE
Problem: Altered Mood, Depressive Behavior  Goal: LTG-Absence of self-harm  Outcome: Ongoing  During one on one conversation this patient denies having any thoughts of wanting to harm self or others. This patient is social with select peers and is attending groups through out the morning. This patient states she she is up most of the night, however this patient sleeps off and on through out the day when there are no groups. This patient has a good appetite as well. This patient states that she is ready for discharge and hope to go home soon. Until then this patient will remain on 15 min checks per unit policy to maintain safety.

## 2017-11-26 PROCEDURE — 1240000000 HC EMOTIONAL WELLNESS R&B

## 2017-11-26 PROCEDURE — 6370000000 HC RX 637 (ALT 250 FOR IP): Performed by: PSYCHIATRY & NEUROLOGY

## 2017-11-26 PROCEDURE — 6360000002 HC RX W HCPCS: Performed by: PSYCHIATRY & NEUROLOGY

## 2017-11-26 RX ORDER — OXCARBAZEPINE 300 MG/1
150 TABLET, FILM COATED ORAL 2 TIMES DAILY
Status: DISCONTINUED | OUTPATIENT
Start: 2017-11-26 | End: 2017-12-01 | Stop reason: HOSPADM

## 2017-11-26 RX ORDER — CHLORPROMAZINE HYDROCHLORIDE 100 MG/1
200 TABLET, FILM COATED ORAL NIGHTLY
Status: DISCONTINUED | OUTPATIENT
Start: 2017-11-26 | End: 2017-11-28

## 2017-11-26 RX ORDER — IBUPROFEN 400 MG/1
400 TABLET ORAL 3 TIMES DAILY PRN
Status: DISCONTINUED | OUTPATIENT
Start: 2017-11-26 | End: 2017-12-01 | Stop reason: HOSPADM

## 2017-11-26 RX ADMIN — HYDROXYZINE HYDROCHLORIDE 25 MG: 25 TABLET, FILM COATED ORAL at 06:24

## 2017-11-26 RX ADMIN — ACETAMINOPHEN 325 MG: 325 TABLET, FILM COATED ORAL at 06:28

## 2017-11-26 RX ADMIN — GABAPENTIN 400 MG: 400 CAPSULE ORAL at 20:55

## 2017-11-26 RX ADMIN — HYDRALAZINE HYDROCHLORIDE 50 MG: 50 TABLET, FILM COATED ORAL at 20:55

## 2017-11-26 RX ADMIN — CLONAZEPAM 1 MG: 1 TABLET ORAL at 20:55

## 2017-11-26 RX ADMIN — DOXEPIN HYDROCHLORIDE 100 MG: 50 CAPSULE ORAL at 21:00

## 2017-11-26 RX ADMIN — SUCRALFATE 1 G: 1 TABLET ORAL at 15:51

## 2017-11-26 RX ADMIN — IBUPROFEN 400 MG: 400 TABLET, FILM COATED ORAL at 20:52

## 2017-11-26 RX ADMIN — SUCRALFATE 1 G: 1 TABLET ORAL at 06:24

## 2017-11-26 RX ADMIN — FLUTICASONE PROPIONATE 1 SPRAY: 50 SPRAY, METERED NASAL at 20:58

## 2017-11-26 RX ADMIN — SUCRALFATE 1 G: 1 TABLET ORAL at 10:46

## 2017-11-26 RX ADMIN — CHLORPROMAZINE HYDROCHLORIDE 200 MG: 100 TABLET, SUGAR COATED ORAL at 21:51

## 2017-11-26 RX ADMIN — CARIPRAZINE 3 MG: 1.5 CAPSULE, GELATIN COATED ORAL at 08:29

## 2017-11-26 RX ADMIN — CARBAMAZEPINE 300 MG: 300 CAPSULE, EXTENDED RELEASE ORAL at 08:29

## 2017-11-26 RX ADMIN — CLONAZEPAM 1 MG: 1 TABLET ORAL at 14:16

## 2017-11-26 RX ADMIN — FLUTICASONE PROPIONATE 1 SPRAY: 50 SPRAY, METERED NASAL at 08:29

## 2017-11-26 RX ADMIN — GABAPENTIN 400 MG: 400 CAPSULE ORAL at 08:30

## 2017-11-26 RX ADMIN — QUETIAPINE FUMARATE 400 MG: 200 TABLET ORAL at 20:55

## 2017-11-26 RX ADMIN — PRAZOSIN HYDROCHLORIDE 1 MG: 1 CAPSULE ORAL at 09:10

## 2017-11-26 RX ADMIN — OXCARBAZEPINE 150 MG: 300 TABLET ORAL at 20:52

## 2017-11-26 RX ADMIN — POLYETHYLENE GLYCOL 3350 17 G: 17 POWDER, FOR SOLUTION ORAL at 08:32

## 2017-11-26 RX ADMIN — METOPROLOL TARTRATE 50 MG: 50 TABLET ORAL at 20:55

## 2017-11-26 RX ADMIN — ATORVASTATIN CALCIUM 10 MG: 10 TABLET, FILM COATED ORAL at 20:55

## 2017-11-26 RX ADMIN — AMLODIPINE BESYLATE 10 MG: 10 TABLET ORAL at 20:55

## 2017-11-26 RX ADMIN — METOPROLOL TARTRATE 50 MG: 50 TABLET ORAL at 08:30

## 2017-11-26 RX ADMIN — HYDRALAZINE HYDROCHLORIDE 50 MG: 50 TABLET, FILM COATED ORAL at 08:30

## 2017-11-26 RX ADMIN — ALBUTEROL SULFATE 2 PUFF: 90 AEROSOL, METERED RESPIRATORY (INHALATION) at 08:29

## 2017-11-26 RX ADMIN — CLONAZEPAM 1 MG: 1 TABLET ORAL at 08:30

## 2017-11-26 RX ADMIN — HYDROCHLOROTHIAZIDE 25 MG: 25 TABLET ORAL at 08:30

## 2017-11-26 RX ADMIN — ACETAMINOPHEN 325 MG: 325 TABLET, FILM COATED ORAL at 14:29

## 2017-11-26 RX ADMIN — LINACLOTIDE 145 MCG: 145 CAPSULE, GELATIN COATED ORAL at 06:24

## 2017-11-26 RX ADMIN — ESCITALOPRAM OXALATE 20 MG: 20 TABLET ORAL at 08:30

## 2017-11-26 ASSESSMENT — PAIN DESCRIPTION - DESCRIPTORS
DESCRIPTORS: HEADACHE
DESCRIPTORS: ACHING

## 2017-11-26 ASSESSMENT — PAIN SCALES - GENERAL
PAINLEVEL_OUTOF10: 0
PAINLEVEL_OUTOF10: 3
PAINLEVEL_OUTOF10: 3
PAINLEVEL_OUTOF10: 4
PAINLEVEL_OUTOF10: 2

## 2017-11-26 ASSESSMENT — PAIN DESCRIPTION - FREQUENCY
FREQUENCY: INTERMITTENT
FREQUENCY: INTERMITTENT

## 2017-11-26 ASSESSMENT — PAIN DESCRIPTION - PAIN TYPE
TYPE: ACUTE PAIN

## 2017-11-26 ASSESSMENT — PAIN DESCRIPTION - ONSET: ONSET: GRADUAL

## 2017-11-26 ASSESSMENT — PAIN DESCRIPTION - PROGRESSION: CLINICAL_PROGRESSION: NOT CHANGED

## 2017-11-26 ASSESSMENT — PAIN DESCRIPTION - LOCATION
LOCATION: HEAD

## 2017-11-26 ASSESSMENT — PAIN DESCRIPTION - ORIENTATION: ORIENTATION: ANTERIOR

## 2017-11-26 NOTE — PLAN OF CARE
Problem: Altered Mood, Depressive Behavior  Goal: STG-Able to verbalize suicidal ideations  Outcome: Ongoing  Pt denies any suicidal ideations. Pt encouraged to take medication as prescribed. Goal: LTG-Absence of self-harm  Outcome: Ongoing  Pt denies any thoughts of self harm. Pt encouraged to participate in unit programming.

## 2017-11-26 NOTE — PLAN OF CARE
Problem: Altered Mood, Depressive Behavior  Goal: LTG-Absence of self-harm  Outcome: Ongoing  Psychoeducation Group Note    Date: 11/26/2017  Start Time: 0845  End Time: 0900    Number Participants in Group:  11    Goal:  Patient will demonstrate increased interpersonal interaction   Topic: Community meeting/ goals groups    Discipline Responsible:   OT  AT  [facility]. x RT  Other       Participation Level:     None x Minimal    Active Listener  Interactive    Monopolizing         Participation Quality:  x Appropriate  Inappropriate          Attentive        Intrusive          Sharing        Resistant          Supportive        Lethargic       Affective:    Congruent  Incongruent  Blunted  Flat   x Constricted  Anxious  Elated  Angry    Labile  Depressed  Other         Cognitive:  x Alert x Oriented PPTP     Concentration  G  F x P   Attention Span  G  F x P   Short-Term Memory  G x F  P   Long-Term Memory  G x F  P   ProblemSolving/  Decision Making  G x F  P   Ability to Process  Information  G x F  P      Contributing Factors             Delusional             Hallucinating             Flight of Ideas             Other:       Modes of Intervention:  x Education x Support x Exploration   x Clarifying x Problem Solving  Confrontation   x Socialization  Limit Setting x Reality Testing   x Activity  Movement  Media    Other:            Response to Learning:   Able to verbalize current knowledge/experience    Able to verbalize/acknowledge new learning    Able to retain information    Capable of insight    Able to change behavior   x Progressing to goal    Other:        Comments:

## 2017-11-26 NOTE — PLAN OF CARE
Problem: Altered Mood, Depressive Behavior  Goal: LTG-Absence of self-harm  Outcome: Ongoing  Pt did not participate in cognitive skills/ leisure group at 1330 despite staff encouragement to attend.

## 2017-11-27 PROCEDURE — 1240000000 HC EMOTIONAL WELLNESS R&B

## 2017-11-27 PROCEDURE — 6360000002 HC RX W HCPCS: Performed by: PSYCHIATRY & NEUROLOGY

## 2017-11-27 PROCEDURE — 6370000000 HC RX 637 (ALT 250 FOR IP): Performed by: PSYCHIATRY & NEUROLOGY

## 2017-11-27 RX ADMIN — SUCRALFATE 1 G: 1 TABLET ORAL at 07:10

## 2017-11-27 RX ADMIN — CLONAZEPAM 1 MG: 1 TABLET ORAL at 08:52

## 2017-11-27 RX ADMIN — DOXEPIN HYDROCHLORIDE 100 MG: 50 CAPSULE ORAL at 20:33

## 2017-11-27 RX ADMIN — GABAPENTIN 400 MG: 400 CAPSULE ORAL at 20:33

## 2017-11-27 RX ADMIN — METOPROLOL TARTRATE 50 MG: 50 TABLET ORAL at 08:52

## 2017-11-27 RX ADMIN — ESCITALOPRAM OXALATE 20 MG: 20 TABLET ORAL at 08:51

## 2017-11-27 RX ADMIN — ACETAMINOPHEN 325 MG: 325 TABLET, FILM COATED ORAL at 02:19

## 2017-11-27 RX ADMIN — OXCARBAZEPINE 150 MG: 300 TABLET ORAL at 20:34

## 2017-11-27 RX ADMIN — CLONAZEPAM 1 MG: 1 TABLET ORAL at 20:33

## 2017-11-27 RX ADMIN — MAGNESIUM HYDROXIDE 30 ML: 400 SUSPENSION ORAL at 07:10

## 2017-11-27 RX ADMIN — IBUPROFEN 400 MG: 400 TABLET, FILM COATED ORAL at 03:18

## 2017-11-27 RX ADMIN — AMLODIPINE BESYLATE 10 MG: 10 TABLET ORAL at 20:33

## 2017-11-27 RX ADMIN — FLUTICASONE PROPIONATE 1 SPRAY: 50 SPRAY, METERED NASAL at 08:51

## 2017-11-27 RX ADMIN — HYDRALAZINE HYDROCHLORIDE 50 MG: 50 TABLET, FILM COATED ORAL at 08:52

## 2017-11-27 RX ADMIN — CARIPRAZINE 3 MG: 1.5 CAPSULE, GELATIN COATED ORAL at 08:51

## 2017-11-27 RX ADMIN — QUETIAPINE FUMARATE 400 MG: 200 TABLET ORAL at 20:33

## 2017-11-27 RX ADMIN — GABAPENTIN 400 MG: 400 CAPSULE ORAL at 08:51

## 2017-11-27 RX ADMIN — DIPHENHYDRAMINE HCL 50 MG: 25 TABLET ORAL at 15:30

## 2017-11-27 RX ADMIN — HYDROCHLOROTHIAZIDE 25 MG: 25 TABLET ORAL at 08:51

## 2017-11-27 RX ADMIN — CLONAZEPAM 1 MG: 1 TABLET ORAL at 14:24

## 2017-11-27 RX ADMIN — METOPROLOL TARTRATE 50 MG: 50 TABLET ORAL at 20:33

## 2017-11-27 RX ADMIN — HYDRALAZINE HYDROCHLORIDE 50 MG: 50 TABLET, FILM COATED ORAL at 20:33

## 2017-11-27 RX ADMIN — FLUTICASONE PROPIONATE 1 SPRAY: 50 SPRAY, METERED NASAL at 20:32

## 2017-11-27 RX ADMIN — LINACLOTIDE 145 MCG: 145 CAPSULE, GELATIN COATED ORAL at 07:11

## 2017-11-27 RX ADMIN — ATORVASTATIN CALCIUM 10 MG: 10 TABLET, FILM COATED ORAL at 20:33

## 2017-11-27 RX ADMIN — CHLORPROMAZINE HYDROCHLORIDE 200 MG: 100 TABLET, SUGAR COATED ORAL at 20:33

## 2017-11-27 RX ADMIN — OXCARBAZEPINE 150 MG: 300 TABLET ORAL at 08:51

## 2017-11-27 RX ADMIN — SUCRALFATE 1 G: 1 TABLET ORAL at 11:01

## 2017-11-27 ASSESSMENT — PAIN SCALES - GENERAL
PAINLEVEL_OUTOF10: 3
PAINLEVEL_OUTOF10: 3

## 2017-11-27 NOTE — PLAN OF CARE
Problem: Altered Mood, Depressive Behavior  Goal: LTG-Absence of self-harm  Outcome: Ongoing  Pt states they are not having thoughts of self harm at this time and verbally agrees to seek staff if feelings of harming self arise,free from fall.

## 2017-11-27 NOTE — PROGRESS NOTES
PSYCHOEDUCATION GROUP NOTE    Date:   11/27/2017 Start Time: 1100  End Time: 1140    Number Participants in Group:  6    Goal:  Patient will demonstrate increased interpersonal interaction   Topic: skills group    Discipline Responsible:   OT  AT  [facility]. X RT MHP Other       Participation Level:     None  Minimal   X Active Listener X Interactive    Monopolizing         Participation Quality:  X Appropriate  Inappropriate   X       Attentive        Intrusive   X       Sharing        Resistant   X       Supportive        Lethargic       Affective:    Congruent  Incongruent  Blunted  Flat    Constricted  Anxious  Elated  Angry    Labile  Depressed  Other x bright       Cognitive:  X Alert X Oriented PPTP     Concentration X G  F  P   Attention Span X G  F  P   Short-Term Memory  G  F  P   Long-Term Memory  G  F  P   ProblemSolving/  Decision Making X G  F  P   Ability to Process  Information X G  F  P      Contributing Factors             Delusional             Hallucinating             Flight of Ideas             Other:       Modes of Intervention:  x Education x Support x Exploration    Clarifying x Problem Solving  Confrontation   x Socialization  Limit Setting  Reality Testing   x Activity  Movement  Media    Other:            Response to Learning:  X Able to verbalize current knowledge/experience   X Able to verbalize/acknowledge new learning   X Able to retain information   X Capable of insight    Able to change behavior   X Progressing to goal    Other:        Comments:

## 2017-11-27 NOTE — PLAN OF CARE
Problem: Altered Mood, Depressive Behavior  Goal: STG-Able to verbalize suicidal ideations  Outcome: Ongoing  Pt states they are not having thoughts of self harm at this time and verbally agrees to seek staff if feelings of harming self arise,free from fall.

## 2017-11-28 PROCEDURE — 1240000000 HC EMOTIONAL WELLNESS R&B

## 2017-11-28 PROCEDURE — 6360000002 HC RX W HCPCS: Performed by: PSYCHIATRY & NEUROLOGY

## 2017-11-28 PROCEDURE — 6370000000 HC RX 637 (ALT 250 FOR IP): Performed by: PSYCHIATRY & NEUROLOGY

## 2017-11-28 RX ORDER — CHLORPROMAZINE HYDROCHLORIDE 100 MG/1
200 TABLET, FILM COATED ORAL DAILY
Status: DISCONTINUED | OUTPATIENT
Start: 2017-11-28 | End: 2017-12-01 | Stop reason: HOSPADM

## 2017-11-28 RX ORDER — CHLORPROMAZINE HYDROCHLORIDE 100 MG/1
100 TABLET, FILM COATED ORAL EVERY MORNING
Status: DISCONTINUED | OUTPATIENT
Start: 2017-11-28 | End: 2017-12-01 | Stop reason: HOSPADM

## 2017-11-28 RX ADMIN — METOPROLOL TARTRATE 50 MG: 50 TABLET ORAL at 20:51

## 2017-11-28 RX ADMIN — SUCRALFATE 1 G: 1 TABLET ORAL at 17:03

## 2017-11-28 RX ADMIN — DIPHENHYDRAMINE HCL 50 MG: 25 TABLET ORAL at 20:50

## 2017-11-28 RX ADMIN — HYDROXYZINE HYDROCHLORIDE 25 MG: 25 TABLET, FILM COATED ORAL at 20:50

## 2017-11-28 RX ADMIN — ESCITALOPRAM OXALATE 20 MG: 20 TABLET ORAL at 08:35

## 2017-11-28 RX ADMIN — FLUTICASONE PROPIONATE 1 SPRAY: 50 SPRAY, METERED NASAL at 08:35

## 2017-11-28 RX ADMIN — HYDRALAZINE HYDROCHLORIDE 50 MG: 50 TABLET, FILM COATED ORAL at 08:35

## 2017-11-28 RX ADMIN — CHLORPROMAZINE HYDROCHLORIDE 200 MG: 100 TABLET, SUGAR COATED ORAL at 14:25

## 2017-11-28 RX ADMIN — AMLODIPINE BESYLATE 10 MG: 10 TABLET ORAL at 20:51

## 2017-11-28 RX ADMIN — QUETIAPINE FUMARATE 400 MG: 200 TABLET ORAL at 20:51

## 2017-11-28 RX ADMIN — LINACLOTIDE 145 MCG: 145 CAPSULE, GELATIN COATED ORAL at 06:08

## 2017-11-28 RX ADMIN — CLONAZEPAM 1 MG: 1 TABLET ORAL at 20:51

## 2017-11-28 RX ADMIN — CLONAZEPAM 1 MG: 1 TABLET ORAL at 14:25

## 2017-11-28 RX ADMIN — GABAPENTIN 400 MG: 400 CAPSULE ORAL at 20:50

## 2017-11-28 RX ADMIN — IBUPROFEN 400 MG: 400 TABLET, FILM COATED ORAL at 20:50

## 2017-11-28 RX ADMIN — SUCRALFATE 1 G: 1 TABLET ORAL at 06:10

## 2017-11-28 RX ADMIN — CHLORPROMAZINE HYDROCHLORIDE 100 MG: 100 TABLET, SUGAR COATED ORAL at 11:01

## 2017-11-28 RX ADMIN — SUCRALFATE 1 G: 1 TABLET ORAL at 11:01

## 2017-11-28 RX ADMIN — HYDROCHLOROTHIAZIDE 25 MG: 25 TABLET ORAL at 08:36

## 2017-11-28 RX ADMIN — DIPHENHYDRAMINE HCL 50 MG: 25 TABLET ORAL at 08:35

## 2017-11-28 RX ADMIN — OXCARBAZEPINE 150 MG: 300 TABLET ORAL at 08:37

## 2017-11-28 RX ADMIN — ATORVASTATIN CALCIUM 10 MG: 10 TABLET, FILM COATED ORAL at 20:51

## 2017-11-28 RX ADMIN — OXCARBAZEPINE 150 MG: 300 TABLET ORAL at 20:51

## 2017-11-28 RX ADMIN — METOPROLOL TARTRATE 50 MG: 50 TABLET ORAL at 08:36

## 2017-11-28 RX ADMIN — MAGNESIUM HYDROXIDE 30 ML: 400 SUSPENSION ORAL at 08:38

## 2017-11-28 RX ADMIN — HYDRALAZINE HYDROCHLORIDE 50 MG: 50 TABLET, FILM COATED ORAL at 20:52

## 2017-11-28 RX ADMIN — POLYETHYLENE GLYCOL 3350 17 G: 17 POWDER, FOR SOLUTION ORAL at 08:34

## 2017-11-28 RX ADMIN — CARIPRAZINE 3 MG: 1.5 CAPSULE, GELATIN COATED ORAL at 08:35

## 2017-11-28 RX ADMIN — ACETAMINOPHEN 325 MG: 325 TABLET, FILM COATED ORAL at 15:32

## 2017-11-28 RX ADMIN — DOXEPIN HYDROCHLORIDE 100 MG: 50 CAPSULE ORAL at 20:52

## 2017-11-28 RX ADMIN — GABAPENTIN 400 MG: 400 CAPSULE ORAL at 08:36

## 2017-11-28 RX ADMIN — FLUTICASONE PROPIONATE 1 SPRAY: 50 SPRAY, METERED NASAL at 20:53

## 2017-11-28 RX ADMIN — CLONAZEPAM 1 MG: 1 TABLET ORAL at 08:35

## 2017-11-28 ASSESSMENT — PAIN SCALES - GENERAL
PAINLEVEL_OUTOF10: 0
PAINLEVEL_OUTOF10: 2
PAINLEVEL_OUTOF10: 6
PAINLEVEL_OUTOF10: 4

## 2017-11-28 ASSESSMENT — PAIN DESCRIPTION - LOCATION: LOCATION: NECK

## 2017-11-28 ASSESSMENT — PAIN DESCRIPTION - PAIN TYPE: TYPE: ACUTE PAIN

## 2017-11-28 NOTE — PROGRESS NOTES
PSYCHOEDUCATION GROUP NOTE    Date: 11/28/2017   Start Time: 0845  End Time: 0915    Number Participants in Group:  8    Goal:  Patient will demonstrate increased interpersonal interaction   Topic: community meeting / goal setting    Discipline Responsible:   OT  AT  [facility]. X RT MHP Other       Participation Level:     None  Minimal   X Active Listener X Interactive    Monopolizing         Participation Quality:  X Appropriate  Inappropriate   X       Attentive        Intrusive   X       Sharing        Resistant   X       Supportive        Lethargic       Affective:    Congruent  Incongruent  Blunted  Flat    Constricted  Anxious  Elated  Angry    Labile  Depressed  Other x bright       Cognitive:  X Alert X Oriented PPTP     Concentration X G  F  P   Attention Span X G  F  P   Short-Term Memory  G  F  P   Long-Term Memory  G  F  P   ProblemSolving/  Decision Making X G  F  P   Ability to Process  Information X G  F  P      Contributing Factors             Delusional             Hallucinating             Flight of Ideas             Other:       Modes of Intervention:  x Education x Support x Exploration    Clarifying x Problem Solving  Confrontation   x Socialization  Limit Setting  Reality Testing   x Activity  Movement  Media    Other:            Response to Learning:  X Able to verbalize current knowledge/experience   X Able to verbalize/acknowledge new learning   X Able to retain information   X Capable of insight    Able to change behavior   X Progressing to goal    Other:        Comments

## 2017-11-28 NOTE — PROGRESS NOTES
PSYCHOEDUCATION GROUP NOTE    Date:   11/28/2017 Start Time: 1100  End Time: 4236    Number Participants in Group:  4    Goal:  Patient will demonstrate increased interpersonal interaction   Topic: skills group    Discipline Responsible:   OT  AT  [facility]. X RT MHP Other       Participation Level:     None  Minimal   X Active Listener X Interactive    Monopolizing         Participation Quality:  X Appropriate  Inappropriate   X       Attentive        Intrusive   X       Sharing        Resistant   X       Supportive        Lethargic       Affective:    Congruent  Incongruent  Blunted  Flat    Constricted  Anxious  Elated  Angry    Labile  Depressed  Other x bright       Cognitive:  X Alert X Oriented PPTP     Concentration X G  F  P   Attention Span X G  F  P   Short-Term Memory  G  F  P   Long-Term Memory  G  F  P   ProblemSolving/  Decision Making X G  F  P   Ability to Process  Information X G  F  P      Contributing Factors             Delusional             Hallucinating             Flight of Ideas             Other:       Modes of Intervention:  x Education x Support x Exploration    Clarifying x Problem Solving  Confrontation   x Socialization  Limit Setting  Reality Testing   x Activity  Movement  Media    Other:            Response to Learning:  X Able to verbalize current knowledge/experience   X Able to verbalize/acknowledge new learning   X Able to retain information   X Capable of insight    Able to change behavior   X Progressing to goal    Other:        Comments:

## 2017-11-28 NOTE — PLAN OF CARE
Problem: Altered Mood, Depressive Behavior  Goal: LTG-Absence of self-harm  Outcome: Ongoing  Pt visible on the unit, encouraged to attend groups. Pt focused on medications at this time, requesting multiple changes. Pt irritable at times, denies SI, observed socializing with peers. Will monitor for safety and offer support as needed.

## 2017-11-28 NOTE — PLAN OF CARE
Problem: Altered Mood, Depressive Behavior  Goal: LTG-Absence of self-harm  Outcome: Ongoing  Free from self harm

## 2017-11-29 PROCEDURE — 6370000000 HC RX 637 (ALT 250 FOR IP): Performed by: PSYCHIATRY & NEUROLOGY

## 2017-11-29 PROCEDURE — 6360000002 HC RX W HCPCS: Performed by: PSYCHIATRY & NEUROLOGY

## 2017-11-29 PROCEDURE — 1240000000 HC EMOTIONAL WELLNESS R&B

## 2017-11-29 RX ORDER — NICOTINE 21 MG/24HR
1 PATCH, TRANSDERMAL 24 HOURS TRANSDERMAL DAILY
Status: DISCONTINUED | OUTPATIENT
Start: 2017-11-29 | End: 2017-12-01 | Stop reason: HOSPADM

## 2017-11-29 RX ADMIN — CLONAZEPAM 1 MG: 1 TABLET ORAL at 08:51

## 2017-11-29 RX ADMIN — SUCRALFATE 1 G: 1 TABLET ORAL at 17:25

## 2017-11-29 RX ADMIN — HYDROCHLOROTHIAZIDE 25 MG: 25 TABLET ORAL at 08:51

## 2017-11-29 RX ADMIN — CLONAZEPAM 1 MG: 1 TABLET ORAL at 14:09

## 2017-11-29 RX ADMIN — GABAPENTIN 400 MG: 400 CAPSULE ORAL at 20:47

## 2017-11-29 RX ADMIN — CARIPRAZINE 3 MG: 1.5 CAPSULE, GELATIN COATED ORAL at 08:54

## 2017-11-29 RX ADMIN — DIPHENHYDRAMINE HCL 50 MG: 25 TABLET ORAL at 20:46

## 2017-11-29 RX ADMIN — CHLORPROMAZINE HYDROCHLORIDE 100 MG: 100 TABLET, SUGAR COATED ORAL at 08:53

## 2017-11-29 RX ADMIN — AMLODIPINE BESYLATE 10 MG: 10 TABLET ORAL at 20:47

## 2017-11-29 RX ADMIN — SUCRALFATE 1 G: 1 TABLET ORAL at 06:11

## 2017-11-29 RX ADMIN — HYDROXYZINE HYDROCHLORIDE 25 MG: 25 TABLET, FILM COATED ORAL at 20:47

## 2017-11-29 RX ADMIN — CHLORPROMAZINE HYDROCHLORIDE 200 MG: 100 TABLET, SUGAR COATED ORAL at 14:09

## 2017-11-29 RX ADMIN — FLUTICASONE PROPIONATE 1 SPRAY: 50 SPRAY, METERED NASAL at 09:30

## 2017-11-29 RX ADMIN — ESCITALOPRAM OXALATE 20 MG: 20 TABLET ORAL at 08:50

## 2017-11-29 RX ADMIN — ALBUTEROL SULFATE 2 PUFF: 90 AEROSOL, METERED RESPIRATORY (INHALATION) at 05:02

## 2017-11-29 RX ADMIN — SUCRALFATE 1 G: 1 TABLET ORAL at 12:07

## 2017-11-29 RX ADMIN — ATORVASTATIN CALCIUM 10 MG: 10 TABLET, FILM COATED ORAL at 20:47

## 2017-11-29 RX ADMIN — METOPROLOL TARTRATE 50 MG: 50 TABLET ORAL at 20:47

## 2017-11-29 RX ADMIN — DOXEPIN HYDROCHLORIDE 100 MG: 50 CAPSULE ORAL at 20:48

## 2017-11-29 RX ADMIN — MAGNESIUM HYDROXIDE 30 ML: 400 SUSPENSION ORAL at 06:11

## 2017-11-29 RX ADMIN — CLONAZEPAM 1 MG: 1 TABLET ORAL at 20:47

## 2017-11-29 RX ADMIN — GABAPENTIN 400 MG: 400 CAPSULE ORAL at 08:51

## 2017-11-29 RX ADMIN — LINACLOTIDE 145 MCG: 145 CAPSULE, GELATIN COATED ORAL at 06:12

## 2017-11-29 RX ADMIN — HYDRALAZINE HYDROCHLORIDE 50 MG: 50 TABLET, FILM COATED ORAL at 08:53

## 2017-11-29 RX ADMIN — FLUTICASONE PROPIONATE 1 SPRAY: 50 SPRAY, METERED NASAL at 20:47

## 2017-11-29 RX ADMIN — OXCARBAZEPINE 150 MG: 300 TABLET ORAL at 08:51

## 2017-11-29 RX ADMIN — ACETAMINOPHEN 325 MG: 325 TABLET, FILM COATED ORAL at 20:47

## 2017-11-29 RX ADMIN — ACETAMINOPHEN 325 MG: 325 TABLET, FILM COATED ORAL at 06:12

## 2017-11-29 RX ADMIN — QUETIAPINE FUMARATE 400 MG: 200 TABLET ORAL at 20:47

## 2017-11-29 RX ADMIN — METOPROLOL TARTRATE 50 MG: 50 TABLET ORAL at 08:51

## 2017-11-29 RX ADMIN — OXCARBAZEPINE 150 MG: 300 TABLET ORAL at 20:45

## 2017-11-29 RX ADMIN — HYDRALAZINE HYDROCHLORIDE 50 MG: 50 TABLET, FILM COATED ORAL at 20:48

## 2017-11-29 RX ADMIN — IBUPROFEN 400 MG: 400 TABLET, FILM COATED ORAL at 14:12

## 2017-11-29 ASSESSMENT — PAIN SCALES - GENERAL
PAINLEVEL_OUTOF10: 0
PAINLEVEL_OUTOF10: 3
PAINLEVEL_OUTOF10: 4
PAINLEVEL_OUTOF10: 4
PAINLEVEL_OUTOF10: 0

## 2017-11-29 NOTE — CARE COORDINATION
Problem: Depressive Behavior with or without Suicide precautions  Goal: LTG-Able to verbalize acceptance of life and situations over which he or she has no control  Outcome: Ongoing   PSYCHOTHERAPY GROUP NOTE        Date:    11/29/2017              Start Time:       10:00am                  End Time:     10:45am        Number Participants in Group: 7/7        Goals: To participate in group psychotherapy and remain in the here-and-now          RT X SW   Nsg   LPN    BHTII   Other         Participation Level:                   None   Minimal   X Active Listener X Interactive     Monopolizing             Participation Quality:  X Appropriate   Inappropriate   X  Attentive    Intrusive   X  Sharing    Resistant   X  Supportive     Lethargic         Affective:            Congruent   Incongruent   Blunted   Flat     Constricted X Anxious   Elated   Angry     Labile   Depressed   Other             Cognitive:  X Alert   Oriented PPTS      Concentration G X F   P     Attention Span G X F   P     Short-Term Memory G X F   P     Long-Term Memory G X F   P     ProblemSolving/  Decision Making G X F   P     Ability to Process  Information G X F   P          Contributing Factors              Delusional              Hallucinating              Flight of Ideas              Other: poor concentration         Modes of Intervention:    Education X Support X Exploration   X Clarifying X Problem Solving X Confrontation   X Socialization   Limit Setting X Reality Testing     Activity   Movement   Media     Other:               Response to Learning:  X Able to verbalize current knowledge/experience   X Able to verbalize/acknowledge new learning   X Able to retain information     Capable of insight   X Able to change behavior   X Progressing to goal     Other:          Comments:  PT participates in group psychotherapy.

## 2017-11-29 NOTE — PLAN OF CARE
Problem: Altered Mood, Depressive Behavior  Goal: STG-Able to verbalize suicidal ideations  Outcome: Ongoing  Pt denies SI and HI to writer states she is ready to go. Goal: LTG-Absence of self-harm  Outcome: Ongoing  Pt denies ideations of self harm or harm to others at this time.

## 2017-11-29 NOTE — PROGRESS NOTES
PSYCHOEDUCATION GROUP NOTE    Date:   11/29/2017 Start Time: 0845  End Time: 0910    Number Participants in Group:  8    Goal:  Patient will demonstrate increased interpersonal interaction   Topic: community meeting / goal setting    Discipline Responsible:   OT  AT  [facility]. X RT [facility] Other       Participation Level:     None  Minimal   X Active Listener X Interactive    Monopolizing         Participation Quality:  X Appropriate  Inappropriate   X       Attentive        Intrusive   X       Sharing        Resistant   X       Supportive        Lethargic       Affective:    Congruent  Incongruent  Blunted  Flat    Constricted  Anxious  Elated  Angry    Labile  Depressed  Other x bright       Cognitive:  X Alert X Oriented PPTP     Concentration X G  F  P   Attention Span X G  F  P   Short-Term Memory  G  F  P   Long-Term Memory  G  F  P   ProblemSolving/  Decision Making X G  F  P   Ability to Process  Information X G  F  P      Contributing Factors             Delusional             Hallucinating             Flight of Ideas             Other:       Modes of Intervention:  x Education x Support x Exploration    Clarifying x Problem Solving  Confrontation   x Socialization  Limit Setting  Reality Testing   x Activity  Movement  Media    Other:            Response to Learning:  X Able to verbalize current knowledge/experience   X Able to verbalize/acknowledge new learning   X Able to retain information   X Capable of insight    Able to change behavior   X Progressing to goal    Other:        Comments

## 2017-11-29 NOTE — PLAN OF CARE
Problem: Altered Mood, Depressive Behavior  Goal: STG-Able to verbalize suicidal ideations  Outcome: Ongoing  Pt denies any current suicidal ideations upon request this morning. She is up and about the unit this morning, very social and intrusive at times, needing redirected to focus on her metal health and not peers personal business. Manic and pressured but reports she is feeling better and looking forward to discharge soon. Admits to continued anxiety and focused on medications she can take for this. Support and reassurance given. Encouraged to attend unit programing and take medications as prescribed. Agrees to seek out staff as needed and before harming self if negative self harm thoughts arise. Q15 minute checks for safety cont.

## 2017-11-30 PROCEDURE — 6370000000 HC RX 637 (ALT 250 FOR IP): Performed by: PSYCHIATRY & NEUROLOGY

## 2017-11-30 PROCEDURE — 1240000000 HC EMOTIONAL WELLNESS R&B

## 2017-11-30 PROCEDURE — 6360000002 HC RX W HCPCS: Performed by: PSYCHIATRY & NEUROLOGY

## 2017-11-30 RX ORDER — HYDRALAZINE HYDROCHLORIDE 50 MG/1
50 TABLET, FILM COATED ORAL 2 TIMES DAILY
Qty: 60 TABLET | Refills: 0 | Status: SHIPPED | OUTPATIENT
Start: 2017-11-30

## 2017-11-30 RX ORDER — CHLORPROMAZINE HYDROCHLORIDE 200 MG/1
200 TABLET, FILM COATED ORAL DAILY
Qty: 30 TABLET | Refills: 0 | Status: SHIPPED | OUTPATIENT
Start: 2017-11-30

## 2017-11-30 RX ORDER — CHLORPROMAZINE HYDROCHLORIDE 100 MG/1
100 TABLET, FILM COATED ORAL EVERY MORNING
Qty: 30 TABLET | Refills: 0 | Status: SHIPPED | OUTPATIENT
Start: 2017-12-01

## 2017-11-30 RX ORDER — HYDROXYZINE HYDROCHLORIDE 25 MG/1
25 TABLET, FILM COATED ORAL 3 TIMES DAILY PRN
Qty: 90 TABLET | Refills: 0 | Status: SHIPPED | OUTPATIENT
Start: 2017-11-30 | End: 2017-12-10

## 2017-11-30 RX ORDER — ALBUTEROL SULFATE 90 UG/1
2 AEROSOL, METERED RESPIRATORY (INHALATION) EVERY 6 HOURS PRN
Qty: 1 INHALER | Refills: 0 | Status: SHIPPED | OUTPATIENT
Start: 2017-11-30

## 2017-11-30 RX ORDER — SUCRALFATE 1 G/1
1 TABLET ORAL
Qty: 120 TABLET | Refills: 0 | Status: SHIPPED | OUTPATIENT
Start: 2017-11-30

## 2017-11-30 RX ORDER — METOPROLOL TARTRATE 50 MG/1
50 TABLET, FILM COATED ORAL 2 TIMES DAILY
Qty: 60 TABLET | Refills: 0 | Status: SHIPPED | OUTPATIENT
Start: 2017-11-30

## 2017-11-30 RX ORDER — AMLODIPINE BESYLATE 10 MG/1
10 TABLET ORAL DAILY
Qty: 30 TABLET | Refills: 0 | Status: SHIPPED | OUTPATIENT
Start: 2017-11-30

## 2017-11-30 RX ORDER — ATORVASTATIN CALCIUM 10 MG/1
10 TABLET, FILM COATED ORAL NIGHTLY
Qty: 30 TABLET | Refills: 0 | Status: SHIPPED | OUTPATIENT
Start: 2017-11-30

## 2017-11-30 RX ORDER — FLUTICASONE PROPIONATE 50 MCG
2 SPRAY, SUSPENSION (ML) NASAL DAILY
Qty: 1 BOTTLE | Refills: 0 | Status: SHIPPED | OUTPATIENT
Start: 2017-11-30

## 2017-11-30 RX ORDER — CLONAZEPAM 1 MG/1
1 TABLET ORAL 3 TIMES DAILY
Qty: 45 TABLET | Refills: 0 | Status: SHIPPED | OUTPATIENT
Start: 2017-11-30

## 2017-11-30 RX ORDER — POLYETHYLENE GLYCOL 3350 17 G/17G
17 POWDER, FOR SOLUTION ORAL DAILY PRN
Qty: 527 G | Refills: 0 | Status: SHIPPED | OUTPATIENT
Start: 2017-11-30

## 2017-11-30 RX ORDER — ESCITALOPRAM OXALATE 20 MG/1
20 TABLET ORAL DAILY
Qty: 30 TABLET | Refills: 0 | Status: SHIPPED | OUTPATIENT
Start: 2017-12-01

## 2017-11-30 RX ORDER — QUETIAPINE 400 MG/1
400 TABLET, FILM COATED, EXTENDED RELEASE ORAL NIGHTLY
Qty: 30 TABLET | Refills: 0 | Status: SHIPPED | OUTPATIENT
Start: 2017-11-30

## 2017-11-30 RX ORDER — HYDROCHLOROTHIAZIDE 25 MG/1
25 TABLET ORAL DAILY
Qty: 30 TABLET | Refills: 0 | Status: SHIPPED | OUTPATIENT
Start: 2017-11-30

## 2017-11-30 RX ORDER — GABAPENTIN 400 MG/1
400 CAPSULE ORAL 2 TIMES DAILY
Qty: 60 CAPSULE | Refills: 0 | Status: SHIPPED | OUTPATIENT
Start: 2017-11-30

## 2017-11-30 RX ORDER — DOXEPIN HYDROCHLORIDE 100 MG/1
100 CAPSULE ORAL NIGHTLY
Qty: 30 CAPSULE | Refills: 0 | Status: SHIPPED | OUTPATIENT
Start: 2017-11-30

## 2017-11-30 RX ORDER — OXCARBAZEPINE 150 MG/1
150 TABLET, FILM COATED ORAL 2 TIMES DAILY
Qty: 60 TABLET | Refills: 0 | Status: SHIPPED | OUTPATIENT
Start: 2017-11-30

## 2017-11-30 RX ORDER — ONDANSETRON 4 MG/1
4 TABLET, FILM COATED ORAL EVERY 8 HOURS PRN
Qty: 30 TABLET | Refills: 0 | Status: SHIPPED | OUTPATIENT
Start: 2017-11-30

## 2017-11-30 RX ORDER — MAGNESIUM OXIDE 500 MG
5 TABLET ORAL NIGHTLY PRN
Qty: 30 TABLET | Refills: 0 | Status: SHIPPED | OUTPATIENT
Start: 2017-11-30

## 2017-11-30 RX ADMIN — GABAPENTIN 400 MG: 400 CAPSULE ORAL at 20:34

## 2017-11-30 RX ADMIN — CLONAZEPAM 1 MG: 1 TABLET ORAL at 20:33

## 2017-11-30 RX ADMIN — HYDROCHLOROTHIAZIDE 25 MG: 25 TABLET ORAL at 08:34

## 2017-11-30 RX ADMIN — METOPROLOL TARTRATE 50 MG: 50 TABLET ORAL at 20:32

## 2017-11-30 RX ADMIN — POLYETHYLENE GLYCOL 3350 17 G: 17 POWDER, FOR SOLUTION ORAL at 08:32

## 2017-11-30 RX ADMIN — SUCRALFATE 1 G: 1 TABLET ORAL at 06:59

## 2017-11-30 RX ADMIN — HYDROXYZINE HYDROCHLORIDE 25 MG: 25 TABLET, FILM COATED ORAL at 20:34

## 2017-11-30 RX ADMIN — CHLORPROMAZINE HYDROCHLORIDE 100 MG: 100 TABLET, SUGAR COATED ORAL at 08:34

## 2017-11-30 RX ADMIN — CHLORPROMAZINE HYDROCHLORIDE 200 MG: 100 TABLET, SUGAR COATED ORAL at 13:34

## 2017-11-30 RX ADMIN — ESCITALOPRAM OXALATE 20 MG: 20 TABLET ORAL at 08:34

## 2017-11-30 RX ADMIN — SUCRALFATE 1 G: 1 TABLET ORAL at 17:04

## 2017-11-30 RX ADMIN — OXCARBAZEPINE 150 MG: 300 TABLET ORAL at 20:32

## 2017-11-30 RX ADMIN — DOXEPIN HYDROCHLORIDE 100 MG: 50 CAPSULE ORAL at 20:30

## 2017-11-30 RX ADMIN — QUETIAPINE FUMARATE 400 MG: 200 TABLET ORAL at 20:33

## 2017-11-30 RX ADMIN — CLONAZEPAM 1 MG: 1 TABLET ORAL at 08:34

## 2017-11-30 RX ADMIN — METOPROLOL TARTRATE 50 MG: 50 TABLET ORAL at 08:41

## 2017-11-30 RX ADMIN — CLONAZEPAM 1 MG: 1 TABLET ORAL at 13:34

## 2017-11-30 RX ADMIN — MAGNESIUM HYDROXIDE 30 ML: 400 SUSPENSION ORAL at 06:59

## 2017-11-30 RX ADMIN — HYDRALAZINE HYDROCHLORIDE 50 MG: 50 TABLET, FILM COATED ORAL at 20:44

## 2017-11-30 RX ADMIN — IBUPROFEN 400 MG: 400 TABLET, FILM COATED ORAL at 13:34

## 2017-11-30 RX ADMIN — ATORVASTATIN CALCIUM 10 MG: 10 TABLET, FILM COATED ORAL at 20:34

## 2017-11-30 RX ADMIN — CARIPRAZINE 3 MG: 1.5 CAPSULE, GELATIN COATED ORAL at 08:34

## 2017-11-30 RX ADMIN — SUCRALFATE 1 G: 1 TABLET ORAL at 11:05

## 2017-11-30 RX ADMIN — FLUTICASONE PROPIONATE 1 SPRAY: 50 SPRAY, METERED NASAL at 08:31

## 2017-11-30 RX ADMIN — IBUPROFEN 400 MG: 400 TABLET, FILM COATED ORAL at 05:39

## 2017-11-30 RX ADMIN — HYDRALAZINE HYDROCHLORIDE 50 MG: 50 TABLET, FILM COATED ORAL at 08:35

## 2017-11-30 RX ADMIN — LINACLOTIDE 145 MCG: 145 CAPSULE, GELATIN COATED ORAL at 06:59

## 2017-11-30 RX ADMIN — FLUTICASONE PROPIONATE 1 SPRAY: 50 SPRAY, METERED NASAL at 20:35

## 2017-11-30 RX ADMIN — AMLODIPINE BESYLATE 10 MG: 10 TABLET ORAL at 20:30

## 2017-11-30 RX ADMIN — GABAPENTIN 400 MG: 400 CAPSULE ORAL at 08:35

## 2017-11-30 RX ADMIN — OXCARBAZEPINE 150 MG: 300 TABLET ORAL at 08:34

## 2017-11-30 ASSESSMENT — PAIN SCALES - GENERAL
PAINLEVEL_OUTOF10: 4
PAINLEVEL_OUTOF10: 4
PAINLEVEL_OUTOF10: 0
PAINLEVEL_OUTOF10: 5

## 2017-11-30 ASSESSMENT — PAIN DESCRIPTION - PAIN TYPE
TYPE: CHRONIC PAIN
TYPE: CHRONIC PAIN
TYPE: ACUTE PAIN

## 2017-11-30 ASSESSMENT — PAIN DESCRIPTION - LOCATION
LOCATION: GENERALIZED
LOCATION: KNEE
LOCATION: GENERALIZED

## 2017-11-30 ASSESSMENT — PAIN DESCRIPTION - DESCRIPTORS: DESCRIPTORS: SHOOTING;ACHING

## 2017-11-30 ASSESSMENT — PAIN DESCRIPTION - PROGRESSION: CLINICAL_PROGRESSION: NOT CHANGED

## 2017-11-30 ASSESSMENT — PAIN DESCRIPTION - ONSET: ONSET: ON-GOING

## 2017-11-30 ASSESSMENT — PAIN DESCRIPTION - FREQUENCY: FREQUENCY: INTERMITTENT

## 2017-11-30 NOTE — PLAN OF CARE
Problem: Depressive Behavior with or without Suicide precautions  Goal: LTG-Able to verbalize acceptance of life and situations over which he or she has no control  Outcome: Ongoing  Pt did not participate in recovery/ social interaction group at 1430 despite staff encouragement to attend.

## 2017-11-30 NOTE — PLAN OF CARE
Problem: Depressive Behavior with or without Suicide precautions  Goal: LTG-Able to verbalize acceptance of life and situations over which he or she has no control  Outcome: Ongoing  Pt reports readiness for discharge tomorrow, states her daughter will be assisting with getting her a place to live. Pt denies SI, reports anxiety related to going home. Pt encouraged to attend groups and seek out staff as needed. Will monitor for safety and offer support as needed.

## 2017-11-30 NOTE — PLAN OF CARE
Problem: Depressive Behavior with or without Suicide precautions  Goal: LTG-Able to verbalize acceptance of life and situations over which he or she has no control  Outcome: Ongoing  Psychoeducation Group Note    Date: 11/30/2017  Start Time: 0845  End Time: 0905    Number Participants in Group:  7    Goal:  Patient will demonstrate increased interpersonal interaction   Topic: Community meeting/ goals group    Discipline Responsible:   OT  AT  [facility]. x RT  Other       Participation Level:     None  Minimal   x Active Listener x Interactive    Monopolizing         Participation Quality:  x Appropriate  Inappropriate   x       Attentive        Intrusive   x       Sharing        Resistant          Supportive        Lethargic       Affective:   x Congruent  Incongruent  Blunted  Flat    Constricted  Anxious  Elated  Angry    Labile  Depressed  Other         Cognitive:  x Alert x Oriented PPTP     Concentration x G  F  P   Attention Span x G  F  P   Short-Term Memory x G  F  P   Long-Term Memory x G  F  P   ProblemSolving/  Decision Making x G  F  P   Ability to Process  Information x G  F  P      Contributing Factors             Delusional             Hallucinating             Flight of Ideas             Other:       Modes of Intervention:  x Education x Support x Exploration   x Clarifying x Problem Solving  Confrontation   x Socialization  Limit Setting x Reality Testing   x Activity  Movement  Media    Other:            Response to Learning:  x Able to verbalize current knowledge/experience   x Able to verbalize/acknowledge new learning   x Able to retain information    Capable of insight    Able to change behavior   x Progressing to goal    Other:        Comments:

## 2017-11-30 NOTE — DISCHARGE SUMMARY
[address]                   [address] [facility], [address]                                 DISCHARGE SUMMARY    PATIENT NAME: Shanon Gong                         :        1965  MED REC NO:   [id number]                              ROOM:       0236  ACCOUNT NO:   [de-identified]                           ADMIT DATE: 2017  PROVIDER:     Marie Adams                   [address] [facility] DATE:      HISTORY OF PRESENTING ILLNESS AND REASON FOR CURRENT ADMISSION:  The  patient is a 55-year-old female, feeling depressed, sad, hopeless, useless,  worthless, having suicidal thoughts and planning to kill herself by  overdose of medication. She feels that people are trying to harm her and  hurt her. She got frustrated, wants to cut her wrist and die. She is  complaining of mood swings, anxiety, and agitation. With this, she is  admitted to [address] [place] [address]:  History of bipolar disorder and borderline  personality disorder. MEDICAL AND SURGICAL HISTORY:  Hypertension, dyslipidemia, brain aneurysm  which was clipped, GERD, COPD and interstitial lung disease. ALLERGIES:  She is allergic to MORPHINE. COURSE DURING THE HOSPITAL STAY:  After getting admitted, she was started  on  Norvasc 10 mg daily, Lipitor 10 MG p.o. daily, _____ 3 mg p.o. daily,  Thorazine 100 mg at 8 a.m. and Thorazine 200 mg at 2 p.m., Klonopin 1 mg  p.o. t.i.d., doxepin 100 mg at bedtime, Lexapro 20 mg p.o. daily, Flonase  nasal spray one spray each naris daily, Neurontin 400 mg three times a day,  hydralazine 50 mg p.o. b.i.d., hydrochlorothiazide 25 mg p.o. daily,  Linzess 145 mg p.o. daily, metoprolol 50 mg p.o. daily, Trileptal 150 mg  p.o. b.i.d., GlycoLax 17 g daily, Seroquel 400 mg at bedtime, sucralfate 1  g three times a day by mouth before meals.   With this, she stabilized, and  she is being discharged home    MENTAL STATUS EXAM:  At the time of discharge, the patient is cooperative. She has adequate psychomotor activity. She has adequate rapport. His  speech is within normal limits. Her mood subjectively okay; objectively  appears to be euthymic, has appropriate affect. Thought process and  contents are within normal limits. She denies any hallucinations or  delusions. She denies any suicidal or homicidal thoughts or plans. She is  of average intelligence. She is oriented to time, place and person. Her  memory to recent, remote and immediate events are within normal limits. She has adequate attention and concentration. Her insight and judgment are  fair. Her abstraction is fair. DIAGNOSES:  1. Bipolar disorder, current episode depressed. 2.  Borderline personality disorder. 3.  Hypertension. 4.  Dyslipidemia. 5. Interstitial lung disease. 6.  COPD. 7.  GERD. 8.  Brain aneurysm, currently clipped. TREATMENT AND PLAN:  The patient is discharged. She will follow up with  [facility] and her PCP. She goes to [address] [place] [address].         Kaur Davis    D: 11/29/2017 18:48:26       T: 11/30/2017 4:10:19     [email or web address]  Job#: [id number]     Doc#: [id number]    CC:

## 2017-11-30 NOTE — PROGRESS NOTES
PSYCHOEDUCATION GROUP NOTE    Date:  11/30/2017  Start Time: 1100  End Time: 2503    Number Participants in Group:  2    Goal:  Patient will demonstrate increased interpersonal interaction   Topic: skills group    Discipline Responsible:   OT  AT  [facility]. X RT [facility] Other       Participation Level:     None  Minimal   X Active Listener X Interactive    Monopolizing         Participation Quality:  X Appropriate  Inappropriate   X       Attentive        Intrusive   X       Sharing        Resistant   X       Supportive        Lethargic       Affective:    Congruent  Incongruent  Blunted  Flat    Constricted  Anxious  Elated  Angry    Labile  Depressed  Other x bright       Cognitive:  X Alert X Oriented PPTP     Concentration X G  F  P   Attention Span X G  F  P   Short-Term Memory  G  F  P   Long-Term Memory  G  F  P   ProblemSolving/  Decision Making X G  F  P   Ability to Process  Information X G  F  P      Contributing Factors             Delusional             Hallucinating             Flight of Ideas             Other:       Modes of Intervention:  x Education x Support x Exploration    Clarifying x Problem Solving  Confrontation   x Socialization  Limit Setting  Reality Testing   x Activity  Movement  Media    Other:            Response to Learning:  X Able to verbalize current knowledge/experience   X Able to verbalize/acknowledge new learning   X Able to retain information   X Capable of insight    Able to change behavior   X Progressing to goal    Other:        Comments:

## 2017-12-01 VITALS
BODY MASS INDEX: 34.99 KG/M2 | OXYGEN SATURATION: 100 % | TEMPERATURE: 98.4 F | HEIGHT: 65 IN | WEIGHT: 210 LBS | SYSTOLIC BLOOD PRESSURE: 143 MMHG | RESPIRATION RATE: 14 BRPM | DIASTOLIC BLOOD PRESSURE: 82 MMHG | HEART RATE: 69 BPM

## 2017-12-01 PROCEDURE — 6360000002 HC RX W HCPCS: Performed by: PSYCHIATRY & NEUROLOGY

## 2017-12-01 PROCEDURE — 6370000000 HC RX 637 (ALT 250 FOR IP): Performed by: PSYCHIATRY & NEUROLOGY

## 2017-12-01 RX ADMIN — CARIPRAZINE 3 MG: 1.5 CAPSULE, GELATIN COATED ORAL at 08:29

## 2017-12-01 RX ADMIN — LINACLOTIDE 145 MCG: 145 CAPSULE, GELATIN COATED ORAL at 06:29

## 2017-12-01 RX ADMIN — HYDROCHLOROTHIAZIDE 25 MG: 25 TABLET ORAL at 08:32

## 2017-12-01 RX ADMIN — HYDRALAZINE HYDROCHLORIDE 50 MG: 50 TABLET, FILM COATED ORAL at 08:31

## 2017-12-01 RX ADMIN — SUCRALFATE 1 G: 1 TABLET ORAL at 06:29

## 2017-12-01 RX ADMIN — HYDROXYZINE HYDROCHLORIDE 25 MG: 25 TABLET, FILM COATED ORAL at 07:31

## 2017-12-01 RX ADMIN — ACETAMINOPHEN 325 MG: 325 TABLET, FILM COATED ORAL at 08:41

## 2017-12-01 RX ADMIN — METOPROLOL TARTRATE 50 MG: 50 TABLET ORAL at 08:32

## 2017-12-01 RX ADMIN — FLUTICASONE PROPIONATE 1 SPRAY: 50 SPRAY, METERED NASAL at 08:34

## 2017-12-01 RX ADMIN — CHLORPROMAZINE HYDROCHLORIDE 100 MG: 100 TABLET, SUGAR COATED ORAL at 08:30

## 2017-12-01 RX ADMIN — GABAPENTIN 400 MG: 400 CAPSULE ORAL at 08:31

## 2017-12-01 RX ADMIN — CLONAZEPAM 1 MG: 1 TABLET ORAL at 08:30

## 2017-12-01 RX ADMIN — OXCARBAZEPINE 150 MG: 300 TABLET ORAL at 08:31

## 2017-12-01 RX ADMIN — ESCITALOPRAM OXALATE 20 MG: 20 TABLET ORAL at 08:30

## 2017-12-01 ASSESSMENT — PAIN DESCRIPTION - LOCATION: LOCATION: HEAD

## 2017-12-01 ASSESSMENT — PAIN SCALES - GENERAL
PAINLEVEL_OUTOF10: 5
PAINLEVEL_OUTOF10: 0

## 2017-12-01 ASSESSMENT — PAIN DESCRIPTION - PAIN TYPE: TYPE: ACUTE PAIN

## 2017-12-01 NOTE — PLAN OF CARE
Problem: Depressive Behavior with or without Suicide precautions  Goal: STG-Able to verbalize suicidal ideations  Outcome: Ongoing  Pt denies SI

## 2017-12-01 NOTE — PLAN OF CARE
Problem: Depressive Behavior with or without Suicide precautions  Goal: LTG-Able to verbalize acceptance of life and situations over which he or she has no control  Outcome: Ongoing  Pt attending groups to learn coping skills

## 2017-12-01 NOTE — DISCHARGE SUMMARY
Psychiatry: Discharge Note  Patient is stable. She  denies any hallucinations or delusions. She denies any suicidal or homicidal thoughts or plans. She agreed to follow up with [facility]. She verbalizes a plan to f/u with outpatient care and keep herself safe  Discharge diagnosis:  Axis I: Bipolar disorder  Axis II: Borderline personality disorder  Axis III : COPD, GERD, HTN  Axis IV : Lack of support  Axis V 45  Discharge Medications:   Current Discharge Medication List           Details   hydrOXYzine (ATARAX) 25 MG tablet Take 1 tablet by mouth 3 times daily as needed for Itching  Qty: 90 tablet, Refills: 0      albuterol sulfate  (90 Base) MCG/ACT inhaler Inhale 2 puffs into the lungs every 6 hours as needed for Wheezing or Shortness of Breath  Qty: 1 Inhaler, Refills: 0      OXcarbazepine (TRILEPTAL) 150 MG tablet Take 1 tablet by mouth 2 times daily  Qty: 60 tablet, Refills: 0      ondansetron (ZOFRAN) 4 MG tablet Take 1 tablet by mouth every 8 hours as needed for Nausea or Vomiting  Qty: 30 tablet, Refills: 0      diphenhydrAMINE (BENADRYL) 50 MG tablet Take 1 tablet by mouth every 6 hours as needed for Sleep  Qty: 30 tablet, Refills: 0      Cariprazine HCl 1.5 MG CAPS Take 2 capsules by mouth daily  Qty: 30 capsule, Refills: 0      melatonin ER 5 MG TBCR Take 5 mg by mouth nightly as needed (sleep)  Qty: 30 tablet, Refills: 0      sucralfate (CARAFATE) 1 GM tablet Take 1 tablet by mouth 3 times daily (before meals)  Qty: 120 tablet, Refills: 0              Details   clonazePAM (KLONOPIN) 1 MG tablet Take 1 tablet by mouth 3 times daily .   Qty: 45 tablet, Refills: 0      gabapentin (NEURONTIN) 400 MG capsule Take 1 capsule by mouth 2 times daily  Qty: 60 capsule, Refills: 0      doxepin (SINEQUAN) 100 MG capsule Take 1 capsule by mouth nightly  Qty: 30 capsule, Refills: 0      escitalopram (LEXAPRO) 20 MG tablet Take 1 tablet by mouth daily  Qty: 30 tablet, Refills: 0 atorvastatin (LIPITOR) 10 MG tablet Take 1 tablet by mouth nightly  Qty: 30 tablet, Refills: 0      hydrALAZINE (APRESOLINE) 50 MG tablet Take 1 tablet by mouth 2 times daily  Qty: 60 tablet, Refills: 0      !! chlorproMAZINE (THORAZINE) 100 MG tablet Take 1 tablet by mouth every morning  Qty: 30 tablet, Refills: 0      !! chlorproMAZINE (THORAZINE) 200 MG tablet Take 1 tablet by mouth daily  Qty: 30 tablet, Refills: 0      QUEtiapine (SEROQUEL XR) 400 MG extended release tablet Take 1 tablet by mouth nightly  Qty: 30 tablet, Refills: 0      metoprolol tartrate (LOPRESSOR) 50 MG tablet Take 1 tablet by mouth 2 times daily  Qty: 60 tablet, Refills: 0      amLODIPine (NORVASC) 10 MG tablet Take 1 tablet by mouth daily  Qty: 30 tablet, Refills: 0      fluticasone (FLONASE) 50 MCG/ACT nasal spray 2 sprays by Nasal route daily  Qty: 1 Bottle, Refills: 0      hydrochlorothiazide (HYDRODIURIL) 25 MG tablet Take 1 tablet by mouth daily  Qty: 30 tablet, Refills: 0      polyethylene glycol (GLYCOLAX) packet Take 17 g by mouth daily as needed for Constipation  Qty: 527 g, Refills: 0      linaclotide (LINZESS) 145 MCG capsule Take 1 capsule by mouth every morning (before breakfast)  Qty: 30 capsule, Refills: 0       !! - Potential duplicate medications found. Please discuss with provider.         Electronically signed by Elio Bermudez MD on 12/1/2017 at 10:21 AM

## 2020-07-16 RX ORDER — TIZANIDINE HYDROCHLORIDE 6 MG/1
6 CAPSULE, GELATIN COATED ORAL 3 TIMES DAILY
Qty: 90 CAPSULE | Refills: 0 | Status: SHIPPED | OUTPATIENT
Start: 2020-07-16 | End: 2020-08-15

## 2020-09-14 RX ORDER — GABAPENTIN 800 MG/1
800 TABLET ORAL 3 TIMES DAILY
Qty: 90 TABLET | Refills: 0 | Status: SHIPPED | OUTPATIENT
Start: 2020-09-14 | End: 2020-10-14

## 2023-10-04 PROCEDURE — 93010 ELECTROCARDIOGRAM REPORT: CPT | Performed by: INTERNAL MEDICINE

## 2023-10-09 ENCOUNTER — TELEPHONE (OUTPATIENT)
Dept: CARDIOLOGY | Facility: CLINIC | Age: 58
End: 2023-10-09
Payer: COMMERCIAL

## 2023-10-09 NOTE — TELEPHONE ENCOUNTER
Patient called and asked if she still needs to complete a stress test. She notes that after she was scheduled for this test she ended up in [facility] and was consulted by [facility] and a heart cath was completed by Dr. Morgan. I requested records to Dr. Alex Leyva DO once in chart to determine if patient still needs stress testing.     Transferred to [facility] for follow up.

## 2024-04-15 NOTE — PLAN OF CARE
----- Message from Hesham Christopher MD sent at 4/14/2024 11:10 PM CDT -----  Please inform patient that the stool cultures have been negative.  If symptoms persist patient will need to see Gastroenterology.   Problem: Altered Mood, Depressive Behavior  Goal: STG-Able to verbalize suicidal ideations  Outcome: Ongoing  Patient currently denies all, stated the change in her meds made her feel jittery, attended group med compliant. Comments: Patient currently denies all, stated the change in her meds made her feel jittery, attended group med compliant.